# Patient Record
Sex: FEMALE | Race: WHITE | NOT HISPANIC OR LATINO | Employment: FULL TIME | URBAN - METROPOLITAN AREA
[De-identification: names, ages, dates, MRNs, and addresses within clinical notes are randomized per-mention and may not be internally consistent; named-entity substitution may affect disease eponyms.]

---

## 2019-08-18 ENCOUNTER — ANESTHESIA EVENT (OUTPATIENT)
Dept: PERIOP | Facility: HOSPITAL | Age: 59
End: 2019-08-18

## 2019-08-18 ENCOUNTER — ANESTHESIA (OUTPATIENT)
Dept: PERIOP | Facility: HOSPITAL | Age: 59
End: 2019-08-18

## 2019-08-18 ENCOUNTER — HOSPITAL ENCOUNTER (OUTPATIENT)
Facility: HOSPITAL | Age: 59
Discharge: HOME | End: 2019-08-19
Attending: EMERGENCY MEDICINE | Admitting: HOSPITALIST

## 2019-08-18 ENCOUNTER — APPOINTMENT (OUTPATIENT)
Dept: CT IMAGING | Facility: HOSPITAL | Age: 59
End: 2019-08-18

## 2019-08-18 DIAGNOSIS — N13.2 HYDRONEPHROSIS WITH URINARY OBSTRUCTION DUE TO RENAL CALCULUS: ICD-10-CM

## 2019-08-18 DIAGNOSIS — N20.1 URETEROLITHIASIS: Primary | ICD-10-CM

## 2019-08-18 DIAGNOSIS — R10.9 LEFT FLANK PAIN: ICD-10-CM

## 2019-08-18 LAB
ALBUMIN SERPL-MCNC: 4 G/DL (ref 3.5–4.8)
ALBUMIN/GLOB SERPL: 1.5 G/DL (ref 1–1.7)
ALP SERPL-CCNC: 58 U/L (ref 32–91)
ALT SERPL W P-5'-P-CCNC: 13 U/L (ref 14–54)
ANION GAP SERPL CALCULATED.3IONS-SCNC: 13.7 MMOL/L (ref 5–15)
ANION GAP SERPL CALCULATED.3IONS-SCNC: 17.4 MMOL/L (ref 5–15)
AST SERPL-CCNC: 18 U/L (ref 15–41)
BACTERIA UR QL AUTO: ABNORMAL /HPF
BASOPHILS # BLD AUTO: 0 10*3/MM3 (ref 0–0.2)
BASOPHILS # BLD AUTO: 0.1 10*3/MM3 (ref 0–0.2)
BASOPHILS NFR BLD AUTO: 0.3 % (ref 0–1.5)
BASOPHILS NFR BLD AUTO: 0.8 % (ref 0–1.5)
BILIRUB SERPL-MCNC: 0.6 MG/DL (ref 0.3–1.2)
BILIRUB UR QL STRIP: NEGATIVE
BUN BLD-MCNC: 15 MG/DL (ref 8–20)
BUN BLD-MCNC: 17 MG/DL (ref 8–20)
BUN/CREAT SERPL: 18.8 (ref 5.4–26.2)
BUN/CREAT SERPL: 21.3 (ref 5.4–26.2)
CALCIUM SPEC-SCNC: 8.3 MG/DL (ref 8.9–10.3)
CALCIUM SPEC-SCNC: 8.7 MG/DL (ref 8.9–10.3)
CHLORIDE SERPL-SCNC: 106 MMOL/L (ref 101–111)
CHLORIDE SERPL-SCNC: 107 MMOL/L (ref 101–111)
CLARITY UR: CLEAR
CO2 SERPL-SCNC: 17 MMOL/L (ref 22–32)
CO2 SERPL-SCNC: 23 MMOL/L (ref 22–32)
COLOR UR: YELLOW
CREAT BLD-MCNC: 0.8 MG/DL (ref 0.4–1)
CREAT BLD-MCNC: 0.8 MG/DL (ref 0.4–1)
DEPRECATED RDW RBC AUTO: 40.7 FL (ref 37–54)
DEPRECATED RDW RBC AUTO: 41.1 FL (ref 37–54)
EOSINOPHIL # BLD AUTO: 0 10*3/MM3 (ref 0–0.4)
EOSINOPHIL # BLD AUTO: 0.1 10*3/MM3 (ref 0–0.4)
EOSINOPHIL NFR BLD AUTO: 0.1 % (ref 0.3–6.2)
EOSINOPHIL NFR BLD AUTO: 1 % (ref 0.3–6.2)
ERYTHROCYTE [DISTWIDTH] IN BLOOD BY AUTOMATED COUNT: 13.1 % (ref 12.3–15.4)
ERYTHROCYTE [DISTWIDTH] IN BLOOD BY AUTOMATED COUNT: 13.1 % (ref 12.3–15.4)
GFR SERPL CREATININE-BSD FRML MDRD: 74 ML/MIN/1.73
GFR SERPL CREATININE-BSD FRML MDRD: 74 ML/MIN/1.73
GLOBULIN UR ELPH-MCNC: 2.6 GM/DL (ref 2.5–3.8)
GLUCOSE BLD-MCNC: 136 MG/DL (ref 65–99)
GLUCOSE BLD-MCNC: 137 MG/DL (ref 65–99)
GLUCOSE UR STRIP-MCNC: NEGATIVE MG/DL
HCT VFR BLD AUTO: 37.7 % (ref 34–46.6)
HCT VFR BLD AUTO: 39.2 % (ref 34–46.6)
HGB BLD-MCNC: 13.1 G/DL (ref 12–15.9)
HGB BLD-MCNC: 13.4 G/DL (ref 12–15.9)
HGB UR QL STRIP.AUTO: NEGATIVE
HYALINE CASTS UR QL AUTO: ABNORMAL /LPF
INR PPP: 0.99 (ref 0.9–1.1)
KETONES UR QL STRIP: ABNORMAL
LEUKOCYTE ESTERASE UR QL STRIP.AUTO: ABNORMAL
LIPASE SERPL-CCNC: 29 U/L (ref 22–51)
LYMPHOCYTES # BLD AUTO: 0.7 10*3/MM3 (ref 0.7–3.1)
LYMPHOCYTES # BLD AUTO: 2.1 10*3/MM3 (ref 0.7–3.1)
LYMPHOCYTES NFR BLD AUTO: 19.9 % (ref 19.6–45.3)
LYMPHOCYTES NFR BLD AUTO: 7.1 % (ref 19.6–45.3)
MCH RBC QN AUTO: 30.2 PG (ref 26.6–33)
MCH RBC QN AUTO: 30.7 PG (ref 26.6–33)
MCHC RBC AUTO-ENTMCNC: 34.2 G/DL (ref 31.5–35.7)
MCHC RBC AUTO-ENTMCNC: 34.7 G/DL (ref 31.5–35.7)
MCV RBC AUTO: 88.3 FL (ref 79–97)
MCV RBC AUTO: 88.7 FL (ref 79–97)
MONOCYTES # BLD AUTO: 0.3 10*3/MM3 (ref 0.1–0.9)
MONOCYTES # BLD AUTO: 0.6 10*3/MM3 (ref 0.1–0.9)
MONOCYTES NFR BLD AUTO: 3.4 % (ref 5–12)
MONOCYTES NFR BLD AUTO: 5.6 % (ref 5–12)
NEUTROPHILS # BLD AUTO: 7.7 10*3/MM3 (ref 1.7–7)
NEUTROPHILS # BLD AUTO: 8.9 10*3/MM3 (ref 1.7–7)
NEUTROPHILS NFR BLD AUTO: 72.7 % (ref 42.7–76)
NEUTROPHILS NFR BLD AUTO: 89.1 % (ref 42.7–76)
NITRITE UR QL STRIP: NEGATIVE
NRBC BLD AUTO-RTO: 0 /100 WBC (ref 0–0.2)
NRBC BLD AUTO-RTO: 0.1 /100 WBC (ref 0–0.2)
PH UR STRIP.AUTO: 7 [PH] (ref 5–8)
PLATELET # BLD AUTO: 285 10*3/MM3 (ref 140–450)
PLATELET # BLD AUTO: 308 10*3/MM3 (ref 140–450)
PMV BLD AUTO: 7.7 FL (ref 6–12)
PMV BLD AUTO: 7.8 FL (ref 6–12)
POTASSIUM BLD-SCNC: 3.4 MMOL/L (ref 3.6–5.1)
POTASSIUM BLD-SCNC: 4.7 MMOL/L (ref 3.6–5.1)
PROT SERPL-MCNC: 6.6 G/DL (ref 6.1–7.9)
PROT UR QL STRIP: NEGATIVE
PROTHROMBIN TIME: 10.2 SECONDS (ref 9.6–11.7)
RBC # BLD AUTO: 4.25 10*6/MM3 (ref 3.77–5.28)
RBC # BLD AUTO: 4.44 10*6/MM3 (ref 3.77–5.28)
RBC # UR: ABNORMAL /HPF
REF LAB TEST METHOD: ABNORMAL
SODIUM BLD-SCNC: 138 MMOL/L (ref 136–144)
SODIUM BLD-SCNC: 138 MMOL/L (ref 136–144)
SP GR UR STRIP: 1.02 (ref 1–1.03)
SQUAMOUS #/AREA URNS HPF: ABNORMAL /HPF
UROBILINOGEN UR QL STRIP: ABNORMAL
WBC NRBC COR # BLD: 10 10*3/MM3 (ref 3.4–10.8)
WBC NRBC COR # BLD: 10.6 10*3/MM3 (ref 3.4–10.8)
WBC UR QL AUTO: ABNORMAL /HPF

## 2019-08-18 PROCEDURE — 82360 CALCULUS ASSAY QUANT: CPT | Performed by: UROLOGY

## 2019-08-18 PROCEDURE — G0378 HOSPITAL OBSERVATION PER HR: HCPCS

## 2019-08-18 PROCEDURE — C1758 CATHETER, URETERAL: HCPCS | Performed by: UROLOGY

## 2019-08-18 PROCEDURE — C1769 GUIDE WIRE: HCPCS | Performed by: UROLOGY

## 2019-08-18 PROCEDURE — 87086 URINE CULTURE/COLONY COUNT: CPT | Performed by: EMERGENCY MEDICINE

## 2019-08-18 PROCEDURE — 85025 COMPLETE CBC W/AUTO DIFF WBC: CPT | Performed by: HOSPITALIST

## 2019-08-18 PROCEDURE — 83036 HEMOGLOBIN GLYCOSYLATED A1C: CPT | Performed by: HOSPITALIST

## 2019-08-18 PROCEDURE — 25010000002 HYDROMORPHONE PER 4 MG: Performed by: EMERGENCY MEDICINE

## 2019-08-18 PROCEDURE — 74176 CT ABD & PELVIS W/O CONTRAST: CPT

## 2019-08-18 PROCEDURE — 83690 ASSAY OF LIPASE: CPT | Performed by: EMERGENCY MEDICINE

## 2019-08-18 PROCEDURE — 96375 TX/PRO/DX INJ NEW DRUG ADDON: CPT

## 2019-08-18 PROCEDURE — 25010000002 ONDANSETRON PER 1 MG: Performed by: STUDENT IN AN ORGANIZED HEALTH CARE EDUCATION/TRAINING PROGRAM

## 2019-08-18 PROCEDURE — 81001 URINALYSIS AUTO W/SCOPE: CPT | Performed by: EMERGENCY MEDICINE

## 2019-08-18 PROCEDURE — 99219 PR INITIAL OBSERVATION CARE/DAY 50 MINUTES: CPT | Performed by: HOSPITALIST

## 2019-08-18 PROCEDURE — 80048 BASIC METABOLIC PNL TOTAL CA: CPT | Performed by: HOSPITALIST

## 2019-08-18 PROCEDURE — 85610 PROTHROMBIN TIME: CPT | Performed by: HOSPITALIST

## 2019-08-18 PROCEDURE — 25010000002 CEFTRIAXONE PER 250 MG: Performed by: HOSPITALIST

## 2019-08-18 PROCEDURE — 25010000002 PROPOFOL 10 MG/ML EMULSION: Performed by: STUDENT IN AN ORGANIZED HEALTH CARE EDUCATION/TRAINING PROGRAM

## 2019-08-18 PROCEDURE — 25010000002 KETOROLAC TROMETHAMINE PER 15 MG: Performed by: STUDENT IN AN ORGANIZED HEALTH CARE EDUCATION/TRAINING PROGRAM

## 2019-08-18 PROCEDURE — 25010000002 FENTANYL CITRATE (PF) 100 MCG/2ML SOLUTION: Performed by: STUDENT IN AN ORGANIZED HEALTH CARE EDUCATION/TRAINING PROGRAM

## 2019-08-18 PROCEDURE — 96376 TX/PRO/DX INJ SAME DRUG ADON: CPT

## 2019-08-18 PROCEDURE — 25010000002 CEFTRIAXONE PER 250 MG: Performed by: EMERGENCY MEDICINE

## 2019-08-18 PROCEDURE — C2617 STENT, NON-COR, TEM W/O DEL: HCPCS | Performed by: UROLOGY

## 2019-08-18 PROCEDURE — 85025 COMPLETE CBC W/AUTO DIFF WBC: CPT | Performed by: EMERGENCY MEDICINE

## 2019-08-18 PROCEDURE — 25010000002 DEXAMETHASONE PER 1 MG: Performed by: STUDENT IN AN ORGANIZED HEALTH CARE EDUCATION/TRAINING PROGRAM

## 2019-08-18 PROCEDURE — 99284 EMERGENCY DEPT VISIT MOD MDM: CPT

## 2019-08-18 PROCEDURE — 25010000002 KETOROLAC TROMETHAMINE PER 15 MG: Performed by: EMERGENCY MEDICINE

## 2019-08-18 PROCEDURE — 25010000002 ONDANSETRON PER 1 MG: Performed by: EMERGENCY MEDICINE

## 2019-08-18 PROCEDURE — 80053 COMPREHEN METABOLIC PANEL: CPT | Performed by: EMERGENCY MEDICINE

## 2019-08-18 PROCEDURE — 96374 THER/PROPH/DIAG INJ IV PUSH: CPT

## 2019-08-18 DEVICE — URETERAL STENT
Type: IMPLANTABLE DEVICE | Site: URETER | Status: FUNCTIONAL
Brand: PERCUFLEX™ PLUS

## 2019-08-18 RX ORDER — PANTOPRAZOLE SODIUM 40 MG/1
40 TABLET, DELAYED RELEASE ORAL
Status: DISCONTINUED | OUTPATIENT
Start: 2019-08-18 | End: 2019-08-19 | Stop reason: HOSPADM

## 2019-08-18 RX ORDER — ONDANSETRON 2 MG/ML
INJECTION INTRAMUSCULAR; INTRAVENOUS AS NEEDED
Status: DISCONTINUED | OUTPATIENT
Start: 2019-08-18 | End: 2019-08-18 | Stop reason: SURG

## 2019-08-18 RX ORDER — ONDANSETRON 2 MG/ML
4 INJECTION INTRAMUSCULAR; INTRAVENOUS ONCE
Status: COMPLETED | OUTPATIENT
Start: 2019-08-18 | End: 2019-08-18

## 2019-08-18 RX ORDER — SODIUM CHLORIDE 9 MG/ML
INJECTION, SOLUTION INTRAVENOUS CONTINUOUS PRN
Status: DISCONTINUED | OUTPATIENT
Start: 2019-08-18 | End: 2019-08-18 | Stop reason: SURG

## 2019-08-18 RX ORDER — HYDROMORPHONE HCL 110MG/55ML
1 PATIENT CONTROLLED ANALGESIA SYRINGE INTRAVENOUS ONCE
Status: COMPLETED | OUTPATIENT
Start: 2019-08-18 | End: 2019-08-18

## 2019-08-18 RX ORDER — SODIUM CHLORIDE 9 MG/ML
100 INJECTION, SOLUTION INTRAVENOUS CONTINUOUS
Status: DISCONTINUED | OUTPATIENT
Start: 2019-08-18 | End: 2019-08-19 | Stop reason: HOSPADM

## 2019-08-18 RX ORDER — SODIUM CHLORIDE 0.9 % (FLUSH) 0.9 %
3-10 SYRINGE (ML) INJECTION AS NEEDED
Status: DISCONTINUED | OUTPATIENT
Start: 2019-08-18 | End: 2019-08-19 | Stop reason: HOSPADM

## 2019-08-18 RX ORDER — ACETAMINOPHEN 160 MG/5ML
650 SOLUTION ORAL EVERY 4 HOURS PRN
Status: DISCONTINUED | OUTPATIENT
Start: 2019-08-18 | End: 2019-08-19 | Stop reason: HOSPADM

## 2019-08-18 RX ORDER — KETOROLAC TROMETHAMINE 30 MG/ML
30 INJECTION, SOLUTION INTRAMUSCULAR; INTRAVENOUS ONCE
Status: COMPLETED | OUTPATIENT
Start: 2019-08-18 | End: 2019-08-18

## 2019-08-18 RX ORDER — POTASSIUM CHLORIDE 20 MEQ/1
40 TABLET, EXTENDED RELEASE ORAL AS NEEDED
Status: DISCONTINUED | OUTPATIENT
Start: 2019-08-18 | End: 2019-08-19 | Stop reason: HOSPADM

## 2019-08-18 RX ORDER — KETOROLAC TROMETHAMINE 30 MG/ML
INJECTION, SOLUTION INTRAMUSCULAR; INTRAVENOUS AS NEEDED
Status: DISCONTINUED | OUTPATIENT
Start: 2019-08-18 | End: 2019-08-18 | Stop reason: SURG

## 2019-08-18 RX ORDER — ACETAMINOPHEN 650 MG/1
650 SUPPOSITORY RECTAL EVERY 4 HOURS PRN
Status: DISCONTINUED | OUTPATIENT
Start: 2019-08-18 | End: 2019-08-19 | Stop reason: HOSPADM

## 2019-08-18 RX ORDER — DOCUSATE SODIUM 100 MG/1
100 CAPSULE, LIQUID FILLED ORAL 2 TIMES DAILY PRN
Status: DISCONTINUED | OUTPATIENT
Start: 2019-08-18 | End: 2019-08-19 | Stop reason: HOSPADM

## 2019-08-18 RX ORDER — SODIUM CHLORIDE 0.9 % (FLUSH) 0.9 %
3 SYRINGE (ML) INJECTION EVERY 12 HOURS SCHEDULED
Status: DISCONTINUED | OUTPATIENT
Start: 2019-08-18 | End: 2019-08-19 | Stop reason: HOSPADM

## 2019-08-18 RX ORDER — DEXAMETHASONE SODIUM PHOSPHATE 4 MG/ML
INJECTION, SOLUTION INTRA-ARTICULAR; INTRALESIONAL; INTRAMUSCULAR; INTRAVENOUS; SOFT TISSUE AS NEEDED
Status: DISCONTINUED | OUTPATIENT
Start: 2019-08-18 | End: 2019-08-18 | Stop reason: SURG

## 2019-08-18 RX ORDER — ONDANSETRON 4 MG/1
4 TABLET, FILM COATED ORAL EVERY 6 HOURS PRN
Status: DISCONTINUED | OUTPATIENT
Start: 2019-08-18 | End: 2019-08-19 | Stop reason: HOSPADM

## 2019-08-18 RX ORDER — PROPOFOL 10 MG/ML
VIAL (ML) INTRAVENOUS AS NEEDED
Status: DISCONTINUED | OUTPATIENT
Start: 2019-08-18 | End: 2019-08-18 | Stop reason: SURG

## 2019-08-18 RX ORDER — FENTANYL CITRATE 50 UG/ML
INJECTION, SOLUTION INTRAMUSCULAR; INTRAVENOUS AS NEEDED
Status: DISCONTINUED | OUTPATIENT
Start: 2019-08-18 | End: 2019-08-18 | Stop reason: SURG

## 2019-08-18 RX ORDER — ONDANSETRON 2 MG/ML
4 INJECTION INTRAMUSCULAR; INTRAVENOUS EVERY 6 HOURS PRN
Status: DISCONTINUED | OUTPATIENT
Start: 2019-08-18 | End: 2019-08-19 | Stop reason: HOSPADM

## 2019-08-18 RX ORDER — LIDOCAINE HYDROCHLORIDE 20 MG/ML
INJECTION, SOLUTION EPIDURAL; INFILTRATION; INTRACAUDAL; PERINEURAL AS NEEDED
Status: DISCONTINUED | OUTPATIENT
Start: 2019-08-18 | End: 2019-08-18 | Stop reason: SURG

## 2019-08-18 RX ORDER — HYDROCODONE BITARTRATE AND ACETAMINOPHEN 5; 325 MG/1; MG/1
1 TABLET ORAL EVERY 4 HOURS PRN
Status: DISCONTINUED | OUTPATIENT
Start: 2019-08-18 | End: 2019-08-19 | Stop reason: HOSPADM

## 2019-08-18 RX ORDER — MORPHINE SULFATE 4 MG/ML
2 INJECTION, SOLUTION INTRAMUSCULAR; INTRAVENOUS
Status: DISCONTINUED | OUTPATIENT
Start: 2019-08-18 | End: 2019-08-19 | Stop reason: HOSPADM

## 2019-08-18 RX ORDER — CEPHALEXIN 500 MG/1
500 CAPSULE ORAL 3 TIMES DAILY
Qty: 20 CAPSULE | Refills: 0 | Status: SHIPPED | OUTPATIENT
Start: 2019-08-18 | End: 2019-08-28

## 2019-08-18 RX ORDER — POTASSIUM CHLORIDE 7.45 MG/ML
10 INJECTION INTRAVENOUS
Status: DISCONTINUED | OUTPATIENT
Start: 2019-08-18 | End: 2019-08-19 | Stop reason: HOSPADM

## 2019-08-18 RX ORDER — POTASSIUM CHLORIDE 1.5 G/1.77G
40 POWDER, FOR SOLUTION ORAL AS NEEDED
Status: DISCONTINUED | OUTPATIENT
Start: 2019-08-18 | End: 2019-08-19 | Stop reason: HOSPADM

## 2019-08-18 RX ORDER — CALCIUM CARBONATE 200(500)MG
1 TABLET,CHEWABLE ORAL 3 TIMES DAILY PRN
Status: DISCONTINUED | OUTPATIENT
Start: 2019-08-18 | End: 2019-08-19 | Stop reason: HOSPADM

## 2019-08-18 RX ORDER — ACETAMINOPHEN 325 MG/1
650 TABLET ORAL EVERY 4 HOURS PRN
Status: DISCONTINUED | OUTPATIENT
Start: 2019-08-18 | End: 2019-08-19 | Stop reason: HOSPADM

## 2019-08-18 RX ORDER — HYDROCODONE BITARTRATE AND ACETAMINOPHEN 7.5; 325 MG/1; MG/1
1 TABLET ORAL EVERY 4 HOURS PRN
Qty: 20 TABLET | Refills: 0 | Status: SHIPPED | OUTPATIENT
Start: 2019-08-18 | End: 2019-08-28

## 2019-08-18 RX ORDER — SODIUM CHLORIDE 0.9 % (FLUSH) 0.9 %
10 SYRINGE (ML) INJECTION AS NEEDED
Status: DISCONTINUED | OUTPATIENT
Start: 2019-08-18 | End: 2019-08-19 | Stop reason: HOSPADM

## 2019-08-18 RX ADMIN — LIDOCAINE HYDROCHLORIDE 100 MG: 20 INJECTION, SOLUTION EPIDURAL; INFILTRATION; INTRACAUDAL; PERINEURAL at 08:41

## 2019-08-18 RX ADMIN — KETOROLAC TROMETHAMINE 30 MG: 30 INJECTION, SOLUTION INTRAMUSCULAR at 06:01

## 2019-08-18 RX ADMIN — PROPOFOL 120 MG: 10 INJECTION, EMULSION INTRAVENOUS at 08:43

## 2019-08-18 RX ADMIN — CEFTRIAXONE SODIUM 1 G: 1 INJECTION, POWDER, FOR SOLUTION INTRAMUSCULAR; INTRAVENOUS at 10:23

## 2019-08-18 RX ADMIN — ONDANSETRON 4 MG: 2 INJECTION INTRAMUSCULAR; INTRAVENOUS at 06:42

## 2019-08-18 RX ADMIN — Medication 3 ML: at 10:23

## 2019-08-18 RX ADMIN — DEXAMETHASONE SODIUM PHOSPHATE 4 MG: 4 INJECTION, SOLUTION INTRAMUSCULAR; INTRAVENOUS at 08:50

## 2019-08-18 RX ADMIN — SODIUM CHLORIDE 1000 ML: 900 INJECTION, SOLUTION INTRAVENOUS at 05:25

## 2019-08-18 RX ADMIN — ONDANSETRON 4 MG: 2 INJECTION INTRAMUSCULAR; INTRAVENOUS at 08:52

## 2019-08-18 RX ADMIN — KETOROLAC TROMETHAMINE 30 MG: 30 INJECTION, SOLUTION INTRAMUSCULAR at 08:57

## 2019-08-18 RX ADMIN — FENTANYL CITRATE 100 MCG: 50 INJECTION, SOLUTION INTRAMUSCULAR; INTRAVENOUS at 08:41

## 2019-08-18 RX ADMIN — CEFTRIAXONE SODIUM 1 G: 10 INJECTION, POWDER, FOR SOLUTION INTRAVENOUS at 07:17

## 2019-08-18 RX ADMIN — PANTOPRAZOLE SODIUM 40 MG: 40 TABLET, DELAYED RELEASE ORAL at 10:23

## 2019-08-18 RX ADMIN — SODIUM CHLORIDE 100 ML/HR: 900 INJECTION, SOLUTION INTRAVENOUS at 10:24

## 2019-08-18 RX ADMIN — ONDANSETRON 4 MG: 2 INJECTION INTRAMUSCULAR; INTRAVENOUS at 05:26

## 2019-08-18 RX ADMIN — HYDROMORPHONE HYDROCHLORIDE 1 MG: 2 INJECTION, SOLUTION INTRAMUSCULAR; INTRAVENOUS; SUBCUTANEOUS at 05:26

## 2019-08-18 RX ADMIN — SODIUM CHLORIDE: 0.9 INJECTION, SOLUTION INTRAVENOUS at 08:52

## 2019-08-18 RX ADMIN — HYDROMORPHONE HYDROCHLORIDE 1 MG: 2 INJECTION, SOLUTION INTRAMUSCULAR; INTRAVENOUS; SUBCUTANEOUS at 06:42

## 2019-08-18 NOTE — ED PROVIDER NOTES
"Subjective   58-year-old female presents with left flank pain.  Patient states the pain started about 3 hours ago.  She states she had some diarrhea earlier in the evening.  She states the pain is severe without radiation.  She has had nausea and vomiting.  She denies any dysuria or hematuria.  She has had no fever.  She denies any alleviating or exacerbating factors.        History provided by:  Patient      Review of Systems   Constitutional: Negative for fatigue and fever.   HENT: Negative for congestion and sore throat.    Eyes: Negative for pain and redness.   Respiratory: Negative for cough and shortness of breath.    Cardiovascular: Negative for chest pain and palpitations.   Gastrointestinal: Positive for abdominal pain, diarrhea, nausea and vomiting.   Genitourinary: Positive for flank pain. Negative for dysuria, frequency and hematuria.   Musculoskeletal: Positive for back pain.   Skin: Negative for rash.   Neurological: Negative for dizziness and headaches.   Psychiatric/Behavioral: Negative for behavioral problems and confusion.       History reviewed. No pertinent past medical history.    No Known Allergies    History reviewed. No pertinent surgical history.    History reviewed. No pertinent family history.    Social History     Socioeconomic History   • Marital status:      Spouse name: Not on file   • Number of children: Not on file   • Years of education: Not on file   • Highest education level: Not on file   Tobacco Use   • Smoking status: Never Smoker   Substance and Sexual Activity   • Alcohol use: No     Frequency: Never       /64   Pulse 74   Temp 97.5 °F (36.4 °C)   Resp 24   Ht 165.1 cm (65\")   Wt 85.2 kg (187 lb 13.3 oz)   LMP 08/01/2019 (Approximate)   SpO2 93%   BMI 31.26 kg/m²       Objective   Physical Exam   Constitutional: She is oriented to person, place, and time. She appears well-developed and well-nourished.   HENT:   Head: Normocephalic and atraumatic.   Eyes: " EOM are normal. Pupils are equal, round, and reactive to light.   Neck: Normal range of motion. Neck supple.   Cardiovascular: Normal rate, regular rhythm and normal heart sounds.   Pulmonary/Chest: Effort normal and breath sounds normal. No respiratory distress.   Abdominal: Soft. Bowel sounds are normal. There is tenderness in the left upper quadrant and left lower quadrant. There is CVA tenderness.   Musculoskeletal: Normal range of motion.   Neurological: She is alert and oriented to person, place, and time.   Skin: Skin is warm and dry.   Nursing note and vitals reviewed.      Procedures           ED Course      Results for orders placed or performed during the hospital encounter of 08/18/19   Comprehensive Metabolic Panel   Result Value Ref Range    Glucose 136 (H) 65 - 99 mg/dL    BUN 17 8 - 20 mg/dL    Creatinine 0.80 0.40 - 1.00 mg/dL    Sodium 138 136 - 144 mmol/L    Potassium 3.4 (L) 3.6 - 5.1 mmol/L    Chloride 107 101 - 111 mmol/L    CO2 17.0 (L) 22.0 - 32.0 mmol/L    Calcium 8.7 (L) 8.9 - 10.3 mg/dL    Total Protein 6.6 6.1 - 7.9 g/dL    Albumin 4.00 3.50 - 4.80 g/dL    ALT (SGPT) 13 (L) 14 - 54 U/L    AST (SGOT) 18 15 - 41 U/L    Alkaline Phosphatase 58 32 - 91 U/L    Total Bilirubin 0.6 0.3 - 1.2 mg/dL    eGFR Non African Amer 74 >60 mL/min/1.73    Globulin 2.6 2.5 - 3.8 gm/dL    A/G Ratio 1.5 1.0 - 1.7 g/dL    BUN/Creatinine Ratio 21.3 5.4 - 26.2    Anion Gap 17.4 (H) 5.0 - 15.0 mmol/L   Lipase   Result Value Ref Range    Lipase 29 22 - 51 U/L   Urinalysis With Culture If Indicated - Urine, Clean Catch   Result Value Ref Range    Color, UA Yellow Yellow, Straw    Appearance, UA Clear Clear    pH, UA 7.0 5.0 - 8.0    Specific Gravity, UA 1.020 1.005 - 1.030    Glucose, UA Negative Negative    Ketones, UA 15 mg/dL (1+) (A) Negative    Bilirubin, UA Negative Negative    Blood, UA Negative Negative    Protein, UA Negative Negative    Leuk Esterase, UA Moderate (2+) (A) Negative    Nitrite, UA Negative  Negative    Urobilinogen, UA 0.2 E.U./dL 0.2 - 1.0 E.U./dL   CBC Auto Differential   Result Value Ref Range    WBC 10.60 3.40 - 10.80 10*3/mm3    RBC 4.44 3.77 - 5.28 10*6/mm3    Hemoglobin 13.4 12.0 - 15.9 g/dL    Hematocrit 39.2 34.0 - 46.6 %    MCV 88.3 79.0 - 97.0 fL    MCH 30.2 26.6 - 33.0 pg    MCHC 34.2 31.5 - 35.7 g/dL    RDW 13.1 12.3 - 15.4 %    RDW-SD 40.7 37.0 - 54.0 fl    MPV 7.7 6.0 - 12.0 fL    Platelets 308 140 - 450 10*3/mm3    Neutrophil % 72.7 42.7 - 76.0 %    Lymphocyte % 19.9 19.6 - 45.3 %    Monocyte % 5.6 5.0 - 12.0 %    Eosinophil % 1.0 0.3 - 6.2 %    Basophil % 0.8 0.0 - 1.5 %    Neutrophils, Absolute 7.70 (H) 1.70 - 7.00 10*3/mm3    Lymphocytes, Absolute 2.10 0.70 - 3.10 10*3/mm3    Monocytes, Absolute 0.60 0.10 - 0.90 10*3/mm3    Eosinophils, Absolute 0.10 0.00 - 0.40 10*3/mm3    Basophils, Absolute 0.10 0.00 - 0.20 10*3/mm3    nRBC 0.0 0.0 - 0.2 /100 WBC   Urinalysis, Microscopic Only - Urine, Clean Catch   Result Value Ref Range    RBC, UA 0-2 (A) None Seen /HPF    WBC, UA 6-12 (A) None Seen /HPF    Bacteria, UA None Seen None Seen /HPF    Squamous Epithelial Cells, UA 0-2 None Seen, 0-2 /HPF    Hyaline Casts, UA 0-2 None Seen /LPF    Methodology Manual Light Microscopy      Ct Abdomen Pelvis Without Contrast    Result Date: 8/18/2019  1.  4 mm stone in the proximal left ureter resulting in severe left-sided hydronephrosis. 2.  Left adrenal mass that is likely an adenoma measuring 2.7 cm across. 3.  Incidentally noted right hepatic lobe cyst. Electronically signed by:  Terry Carney M.D.  8/18/2019 4:13 AM                MDM  Patient had the above evaluation.  Results were discussed with the patient.  IV access was established and the patient was given Dilaudid and Zofran.  She continued to have severe pain.  She was given additional doses of Toradol and Dilaudid.  She states her pain is now getting better.  CT is showing a 4 mm stone in the proximal left ureter with severe  left-sided hydronephrosis.  She was given a dose of Rocephin.  I discussed with the urologist on-call who will consult on the patient.  I also discussed with the hospitalist who agreed to admit.    Final diagnoses:   Ureterolithiasis   Hydronephrosis with urinary obstruction due to renal calculus   Left flank pain            Miles Isaacs MD  08/18/19 [id number]

## 2019-08-18 NOTE — ANESTHESIA PROCEDURE NOTES
Airway  Urgency: elective    Date/Time: 8/18/2019 8:43 AM  End Time:8/18/2019 8:47 AM  Airway not difficult    General Information and Staff    Patient location during procedure: OR  Anesthesiologist: Terry Robertson MD    Indications and Patient Condition  Indications for airway management: CNS depression    Preoxygenated: yes  MILS maintained throughout  Mask difficulty assessment: 0 - not attempted    Final Airway Details  Final airway type: supraglottic airway      Successful airway: unique  Size 4    Number of attempts at approach: 1

## 2019-08-18 NOTE — OP NOTE
CYSTOSCOPY URETEROSCOPY LASER LITHOTRIPSY  Procedure Report    Patient Name:  Lili Cates  YOB: 1960    Date of Surgery:  8/18/2019     Indications: Left ureteral calculus causing significant left flank pain measuring approximately 4 mm in the proximal left ureter    Pre-op Diagnosis:   Ureterolithiasis [N20.1]  Hydronephrosis with urinary obstruction due to renal calculus [N13.2]  Left flank pain [R10.9]       Post-Op Diagnosis Codes:     * Ureterolithiasis [N20.1]     * Hydronephrosis with urinary obstruction due to renal calculus [N13.2]     * Left flank pain [R10.9]    Procedure/CPT® Codes:      Procedure(s):  CYSTOSCOPY L URETEROSCOPIC STONE EXTRACTION PLACEMENT L URETERAL STENT    Staff:  Surgeon(s):  Yvon Blake MD         Anesthesia: General    Estimated Blood Loss: minimal    Implants:    Left ureteral stent    Specimen:          A: Left ureteral calculus      Findings: Proximal left ureteral calculus was removed    Complications: None    Description of Procedure: Patient induced with general anesthesia and placed in dorsal lithotomy position.  Prepped and draped in sterile fashion.  22 Slovenian cystoscope introduced under direct vision.  Urethra is within normal limits.  Bladder was normal throughout with no tumor stone diverticulum or foreign body.  Both ureteral orifice visualized and are normal.    Guidewire was passed through the cystoscope and up the left ureter under fluoroscopy.  Dual-lumen catheter used to dilate the distal ureter.  Rigid ureteroscope passed next to the wire and up into the proximal ureter.  Could not get to the level of the stone.  Therefore a second wire was placed.  The flexible ureteroscope was then passed over this wire and up to the level of the stone.  The stone was grasped and removed using a nitinol basket.  Repeat ureteroscopy did not reveal any further stones.    Indwelling wire was backloaded through the cystoscope.  A 6 [place]ian by 26 cm double-J  stent was passed over the wire and up into the left kidney under fluoroscopy.  The wire was removed.  A good curl was seen in the left kidney under fluoroscopy.  A good curl was seen in the bladder endoscopically.  Patient's bladder was emptied cystoscope was removed.  Patient was taken out of the dorsolithotomy position.  Awakened from general anesthesia.  And transported to the postanesthesia care unit in stable condition having tolerated the procedure well without any complications.  She will follow-up in approximately 1 week for a cystoscopy with stent removal as part of a planned staged procedure      Yvon Blake MD     Date: 8/18/2019  Time: 9:11 AM

## 2019-08-18 NOTE — ANESTHESIA PREPROCEDURE EVALUATION
Anesthesia Evaluation     Patient summary reviewed and Nursing notes reviewed   no history of anesthetic complications:  NPO Solid Status: > 8 hours  NPO Liquid Status: > 2 hours           Airway   Mallampati: I  TM distance: >3 FB  Neck ROM: full  No difficulty expected  Dental    (+) poor dentition    Pulmonary - negative pulmonary ROS and normal exam   Cardiovascular - negative cardio ROS and normal exam        Neuro/Psych- negative ROS  GI/Hepatic/Renal/Endo - negative ROS     Musculoskeletal (-) negative ROS    Abdominal  - normal exam   Substance History - negative use     OB/GYN          Other - negative ROS       ROS/Med Hx Other: Never had surgery or anesthesia                  Anesthesia Plan    ASA 2     general     intravenous induction   Anesthetic plan, all risks, benefits, and alternatives have been provided, discussed and informed consent has been obtained with: patient and spouse/significant other.

## 2019-08-18 NOTE — H&P
[facility] HOSPITALIST     Provider, No Known    CHIEF COMPLAINT: Flank pain    HISTORY OF PRESENT ILLNESS:  Patient is a 58-year-old female with no known past medical history however she does not see a doctor on a regular basis.  She presents here today with left flank pain.  She says she is been having some aches in that area for the past week however this morning at about 1 AM she was woken up by severe left flank pain.  Pain was sharp constant and did not radiate.  She does not have dysuria however did notice some blood in her urine one time.  She did have some chills over the past week no mikael fevers.  No nausea no vomiting no chest pain no shortness of breath.  No rash.  No recent weight loss.  Of note she woke up this morning with ecchymosis around her right eye.  She does not know how that happened.  She says she does bruise easily and she thinks she might have rubbed her eye vigorously last evening.  She has no vision changes.  No headache.  No focal weakness no focal numbness no abdominal pain no PND no orthopnea.    ER course UA showed moderate 2+ leukocytes, no blood, CBC showed a WBC count of 10, hemoglobin 13.1, platelets 285, creatinine 0.8, sodium 138.  CT abdomen pelvis showed a 4 mm stone in the proximal left ureter resulting in severe left-sided hydronephrosis, left adrenal mass also was noted 2.7 cm across as well as a livercyst the right lobe.    Past medical history as above  Past surgical history negative  Social history negative x3  Family history diabetes runs in multiple family members  Allergies no known drug allergies  Review of systems negative apart for HPI      History reviewed. No pertinent past medical history.  History reviewed. No pertinent surgical history.  History reviewed. No pertinent family history.  Social History     Tobacco Use   • Smoking status: Never Smoker   Substance Use Topics   • Alcohol use: No     Frequency: Never   • Drug use: Not on file  "    No medications prior to admission.     Allergies:  Patient has no known allergies.      There is no immunization history on file for this patient.    Physical Exam   Constitutional: He is oriented to person, place, and time. No distress.   HENT:   Head: Normocephalic and atraumatic.   Eyes: Conjunctivae and EOM are normal. Pupils are equal, round, and reactive to light.   Neck: No JVD present. No thyromegaly present.   Cardiovascular: Normal rate, regular rhythm, normal heart sounds and intact distal pulses. Exam reveals no gallop and no friction rub.   No murmur heard.  Pulmonary/Chest: Effort normal and breath sounds normal. No stridor. No respiratory distress. He has no wheezes. He has no rales. He exhibits no tenderness.   Abdominal: Soft. Bowel sounds are normal. He exhibits no distension and no mass. There is no tenderness. There is no rebound and no guarding. No hernia.   Musculoskeletal: Normal range of motion.   Lymphadenopathy:     He has no cervical adenopathy.   Neurological: He is alert and oriented to person, place, and time. No cranial nerve deficit or sensory deficit. He exhibits normal muscle tone.   Skin: No rash noted. He is not diaphoretic.   Psychiatric: He has a normal mood and affect.   Vitals reviewed.      Vital Signs  Visit Vitals  /74 (BP Location: Right arm, Patient Position: Lying)   Pulse 55   Temp 97.8 °F (36.6 °C) (Oral)   Resp 15   Ht 165.1 cm (65\")   Wt 85.2 kg (187 lb 13.3 oz)   LMP 08/01/2019 (Approximate)   SpO2 98%   BMI 31.26 kg/m²       Physical Exam         Results Review:    I reviewed the patient's new clinical results.  Lab Results (last 24 hours)     Procedure Component Value Units Date/Time    Basic Metabolic Panel [[id number]]  (Abnormal) Collected:  08/18/19 1029    Specimen:  Blood Updated:  08/18/19 1102     Glucose 137 mg/dL      BUN 15 mg/dL      Creatinine 0.80 mg/dL      Sodium 138 mmol/L      Potassium 4.7 mmol/L      Chloride 106 mmol/L      CO2 23.0 " mmol/L      Calcium 8.3 mg/dL      eGFR Non African Amer 74 mL/min/1.73      BUN/Creatinine Ratio 18.8     Anion Gap 13.7 mmol/L     CBC & Differential [[id number]] Collected:  08/18/19 1029    Specimen:  Blood Updated:  08/18/19 1037    Narrative:       The following orders were created for panel order CBC & Differential.  Procedure                               Abnormality         Status                     ---------                               -----------         ------                     CBC Auto Differential[[id number]]        Abnormal            Final result                 Please view results for these tests on the individual orders.    CBC Auto Differential [[id number]]  (Abnormal) Collected:  08/18/19 1029    Specimen:  Blood Updated:  08/18/19 1037     WBC 10.00 10*3/mm3      RBC 4.25 10*6/mm3      Hemoglobin 13.1 g/dL      Hematocrit 37.7 %      MCV 88.7 fL      MCH 30.7 pg      MCHC 34.7 g/dL      RDW 13.1 %      RDW-SD 41.1 fl      MPV 7.8 fL      Platelets 285 10*3/mm3      Neutrophil % 89.1 %      Lymphocyte % 7.1 %      Monocyte % 3.4 %      Eosinophil % 0.1 %      Basophil % 0.3 %      Neutrophils, Absolute 8.90 10*3/mm3      Lymphocytes, Absolute 0.70 10*3/mm3      Monocytes, Absolute 0.30 10*3/mm3      Eosinophils, Absolute 0.00 10*3/mm3      Basophils, Absolute 0.00 10*3/mm3      nRBC 0.1 /100 WBC     Urinalysis, Microscopic Only - Urine, Clean Catch [[id number]]  (Abnormal) Collected:  08/18/19 0632    Specimen:  Urine, Clean Catch Updated:  08/18/19 0702     RBC, UA 0-2 /HPF      WBC, UA 6-12 /HPF      Bacteria, UA None Seen /HPF      Squamous Epithelial Cells, UA 0-2 /HPF      Hyaline Casts, UA 0-2 /LPF      Methodology Manual Light Microscopy    Urinalysis With Culture If Indicated - Urine, Clean Catch [[id number]]  (Abnormal) Collected:  08/18/19 0632    Specimen:  Urine, Clean Catch Updated:  08/18/19 0702     Color, UA Yellow     Appearance, UA Clear     pH, UA 7.0     Specific [place], UA  1.020     Glucose, UA Negative     Ketones, UA 15 mg/dL (1+)     Bilirubin, UA Negative     Blood, UA Negative     Protein, UA Negative     Leuk Esterase, UA Moderate (2+)     Nitrite, UA Negative     Urobilinogen, UA 0.2 E.U./dL    Urine Culture - Urine, Urine, Clean Catch [[id number]] Collected:  08/18/19 0632    Specimen:  Urine, Clean Catch Updated:  08/18/19 0702    Comprehensive Metabolic Panel [[id number]]  (Abnormal) Collected:  08/18/19 0531    Specimen:  Blood Updated:  08/18/19 0610     Glucose 136 mg/dL      BUN 17 mg/dL      Creatinine 0.80 mg/dL      Sodium 138 mmol/L      Potassium 3.4 mmol/L      Chloride 107 mmol/L      CO2 17.0 mmol/L      Calcium 8.7 mg/dL      Total Protein 6.6 g/dL      Albumin 4.00 g/dL      ALT (SGPT) 13 U/L      AST (SGOT) 18 U/L      Alkaline Phosphatase 58 U/L      Total Bilirubin 0.6 mg/dL      eGFR Non African Amer 74 mL/min/1.73      Globulin 2.6 gm/dL      A/G Ratio 1.5 g/dL      BUN/Creatinine Ratio 21.3     Anion Gap 17.4 mmol/L     Lipase [[id number]]  (Normal) Collected:  08/18/19 0531    Specimen:  Blood Updated:  08/18/19 0610     Lipase 29 U/L     CBC & Differential [[id number]] Collected:  08/18/19 0531    Specimen:  Blood Updated:  08/18/19 0537    Narrative:       The following orders were created for panel order CBC & Differential.  Procedure                               Abnormality         Status                     ---------                               -----------         ------                     CBC Auto Differential[[id number]]        Abnormal            Final result                 Please view results for these tests on the individual orders.    CBC Auto Differential [[id number]]  (Abnormal) Collected:  08/18/19 0531    Specimen:  Blood Updated:  08/18/19 0537     WBC 10.60 10*3/mm3      RBC 4.44 10*6/mm3      Hemoglobin 13.4 g/dL      Hematocrit 39.2 %      MCV 88.3 fL      MCH 30.2 pg      MCHC 34.2 g/dL      RDW 13.1 %      RDW-SD 40.7 fl      MPV 7.7  fL      Platelets 308 10*3/mm3      Neutrophil % 72.7 %      Lymphocyte % 19.9 %      Monocyte % 5.6 %      Eosinophil % 1.0 %      Basophil % 0.8 %      Neutrophils, Absolute 7.70 10*3/mm3      Lymphocytes, Absolute 2.10 10*3/mm3      Monocytes, Absolute 0.60 10*3/mm3      Eosinophils, Absolute 0.10 10*3/mm3      Basophils, Absolute 0.10 10*3/mm3      nRBC 0.0 /100 WBC               Assessment/Plan     Left flank pain  Due to nephrolithiasis and hydronephrosis  Urology on board plan for ureteroscopy  Pain management    Urinary tract infection  Start on ceftriaxone which will be continued  Follow-up on microbiology    Renal mass  Per urology    Liver cyst  LFTs normal  Ultrasound of liver can be done either in or outpatient  We will give outpatient follow-up with GI    Right orbital ecchymosis  No obvious trauma  Check PT/INR patient reports easy bruising  Platelets normal  On no medications at home  Will need follow-up with heme-onc as an outpatient  Hemoglobin, WBC count, platelets within normal limits  Renal function stable normal    Routine  Family history of diabetes we will go ahead and check hemoglobin A1c    DVT PUD prophylaxis    Plan as above      Clarisa Fine MD  08/18/19  12:22 PM

## 2019-08-18 NOTE — CONSULTS
Urology Consult Note    Patient:Lili Cates :1960  Room:  Admit Date2019  Age:58 y.o.     SEX:female     DOS:2019     MR:[id number]     Visit:[id number]       Attending: Miles Isaacs MD  Referring Provider: Dr Fine  Reason for Consultation: Left ureteral calculus    Patient Care Team:  Provider, No Known as PCP - General    Chief complaint left flank pain    Subjective .     History of present illness: 58-year-old woman with some vague left flank pain for approximately 1 week.  Pain became very severe early this a.m..  Pain is 10 out of 10.  Does radiate somewhat to the left lower quadrant.  It is colicky in nature.  There is associated nausea and vomiting.  Pain is better with IV pain medication.  Patient denies any previous history of kidney stones.    CT scan shows a 4 mm proximal left ureteral calculus with severe hydroureteronephrosis down to the level of the stone.  No other stones are seen.  I reviewed these images and interpreted them.    Review of Systems  10 point review of systems were reviewed and are negative except for:  Constitution:  positive for See HPI    History  History reviewed. No pertinent past medical history.  History reviewed. No pertinent surgical history.  Social History     Socioeconomic History   • Marital status:      Spouse name: Not on file   • Number of children: Not on file   • Years of education: Not on file   • Highest education level: Not on file   Tobacco Use   • Smoking status: Never Smoker   Substance and Sexual Activity   • Alcohol use: No     Frequency: Never     History reviewed. No pertinent family history.  Allergy  No Known Allergies  Prior to Admission medications    Not on File         Objective     tMax 24 hours:  Temp (24hrs), Av.5 °F (36.4 °C), Min:97.5 °F (36.4 °C), Max:97.5 °F (36.4 °C)    Vital Sign Ranges:  Temp:  [97.5 °F (36.4 °C)] 97.5 °F (36.4 °C)  Heart Rate:  [74] 74  Resp:  [24] 24  BP: (141-178)/(64-92)  141/64  Intake and Output Last 3 Shifts:  No intake/output data recorded.      Physical Exam:   General Appearance: alert, appears stated age and cooperative  Head: normocephalic, without obvious abnormality and atraumatic  Eyes: lids and lashes normal, conjunctivae and sclerae normal, no icterus, no pallor and corneas clear  Lungs: respirations regular, respirations even and respirations unlabored  Heart: regular rhythm & normal rate  Abdomen: no guarding, no rebound tenderness and Left CVA tenderness  Extremities: moves extremities well, no edema, no cyanosis and no redness  Skin: no bleeding, bruising or rash  Neurologic: Mental Status orientated to person, place, time and situation    Results Review:     Lab Results (last 24 hours)     Procedure Component Value Units Date/Time    Urinalysis, Microscopic Only - Urine, Clean Catch [[id number]]  (Abnormal) Collected:  08/18/19 0632    Specimen:  Urine, Clean Catch Updated:  08/18/19 0702     RBC, UA 0-2 /HPF      WBC, UA 6-12 /HPF      Bacteria, UA None Seen /HPF      Squamous Epithelial Cells, UA 0-2 /HPF      Hyaline Casts, UA 0-2 /LPF      Methodology Manual Light Microscopy    Urinalysis With Culture If Indicated - Urine, Clean Catch [[id number]]  (Abnormal) Collected:  08/18/19 0632    Specimen:  Urine, Clean Catch Updated:  08/18/19 0702     Color, UA Yellow     Appearance, UA Clear     pH, UA 7.0     Specific Gravity, UA 1.020     Glucose, UA Negative     Ketones, UA 15 mg/dL (1+)     Bilirubin, UA Negative     Blood, UA Negative     Protein, UA Negative     Leuk Esterase, UA Moderate (2+)     Nitrite, UA Negative     Urobilinogen, UA 0.2 E.U./dL    Urine Culture - Urine, Urine, Clean Catch [[id number]] Collected:  08/18/19 0632    Specimen:  Urine, Clean Catch Updated:  08/18/19 0702    Comprehensive Metabolic Panel [[id number]]  (Abnormal) Collected:  08/18/19 0531    Specimen:  Blood Updated:  08/18/19 0610     Glucose 136 mg/dL      BUN 17 mg/dL       Creatinine 0.80 mg/dL      Sodium 138 mmol/L      Potassium 3.4 mmol/L      Chloride 107 mmol/L      CO2 17.0 mmol/L      Calcium 8.7 mg/dL      Total Protein 6.6 g/dL      Albumin 4.00 g/dL      ALT (SGPT) 13 U/L      AST (SGOT) 18 U/L      Alkaline Phosphatase 58 U/L      Total Bilirubin 0.6 mg/dL      eGFR Non African Amer 74 mL/min/1.73      Globulin 2.6 gm/dL      A/G Ratio 1.5 g/dL      BUN/Creatinine Ratio 21.3     Anion Gap 17.4 mmol/L     Lipase [[id number]]  (Normal) Collected:  08/18/19 0531    Specimen:  Blood Updated:  08/18/19 0610     Lipase 29 U/L     CBC & Differential [[id number]] Collected:  08/18/19 0531    Specimen:  Blood Updated:  08/18/19 0537    Narrative:       The following orders were created for panel order CBC & Differential.  Procedure                               Abnormality         Status                     ---------                               -----------         ------                     CBC Auto Differential[[id number]]        Abnormal            Final result                 Please view results for these tests on the individual orders.    CBC Auto Differential [[id number]]  (Abnormal) Collected:  08/18/19 0531    Specimen:  Blood Updated:  08/18/19 0537     WBC 10.60 10*3/mm3      RBC 4.44 10*6/mm3      Hemoglobin 13.4 g/dL      Hematocrit 39.2 %      MCV 88.3 fL      MCH 30.2 pg      MCHC 34.2 g/dL      RDW 13.1 %      RDW-SD 40.7 fl      MPV 7.7 fL      Platelets 308 10*3/mm3      Neutrophil % 72.7 %      Lymphocyte % 19.9 %      Monocyte % 5.6 %      Eosinophil % 1.0 %      Basophil % 0.8 %      Neutrophils, Absolute 7.70 10*3/mm3      Lymphocytes, Absolute 2.10 10*3/mm3      Monocytes, Absolute 0.60 10*3/mm3      Eosinophils, Absolute 0.10 10*3/mm3      Basophils, Absolute 0.10 10*3/mm3      nRBC 0.0 /100 WBC          No results found for: URINECX     Imaging Results (last 7 days)     Procedure Component Value Units Date/Time    CT Abdomen Pelvis Without Contrast [[id number]]  Collected:  08/18/19 0406     Updated:  08/18/19 0614    Narrative:       EXAMINATION:  CT SCAN OF THE ABDOMEN AND PELVIS WITHOUT INTRAVENOUS CONTRAST    DATE OF EXAM: 8/18/2019 5:39 AM     HISTORY: Left flank pain      COMPARISON: None.    TECHNIQUE: CT examination of the abdomen and pelvis with sagittal and coronal reformations was performed without intravenous contrast.  CT dose lowering techniques were used, to include: automated exposure control, adjustment for patient size, and/or use   of iterative reconstruction.    Note: The exam is limited because some types of pathology may not be adequately demonstrated due to lack of contrast enhancement.       FINDINGS:    ABDOMEN/PELVIS:    Lower Chest:  Normal.     Liver: There is a cyst within the posterior aspect of the right hepatic lobe that measures 3.5 cm across.    Gallbladder/Biliary: Normal.     Pancreas: Normal.    Spleen: Normal.     Adrenal Glands: Left adrenal mass that is likely an adenoma. It measures 2.7 cm across.    Kidneys: There is a stone in the proximal left ureter that measures 4 mm and is resulting in severe left-sided hydronephrosis.    GI Tract: Normal.     Mesentery/Peritoneum: Left-sided perinephric inflammatory stranding.    Vasculature: Normal.     Lymph Nodes: Normal.     Abdominal Wall: Normal.     Bladder: Normal.     Reproductive: Normal.     Musculoskeletal: Normal.        Impression:         1.  4 mm stone in the proximal left ureter resulting in severe left-sided hydronephrosis.  2.  Left adrenal mass that is likely an adenoma measuring 2.7 cm across.  3.  Incidentally noted right hepatic lobe cyst.      Electronically signed by:  Terry Carney M.D.    8/18/2019 4:13 AM          Inpatient Meds:   Scheduled Meds:    Continuous Infusions:    PRN Meds:.•  [COMPLETED] Insert peripheral IV **AND** sodium chloride      Assessment/Plan     Active Problems:    Ureterolithiasis    Left flank pain  Obstructing proximal left ureteral  calculus    Plan  Proceed with cystoscopy left ureteroscopic stone extraction with possible laser lithotripsy and placement of a left ureteral stent.  Risks, benefits, and alternatives discussed with patient.  She wishes to proceed.        I discussed the patients findings and my recommendations with patient and family    Thank you for this  consult    Yvon Blake MD  08/18/19  7:39 AM

## 2019-08-18 NOTE — ANESTHESIA POSTPROCEDURE EVALUATION
Patient: Lili Cates    Procedure Summary     Date:  08/18/19 Room / Location:  [facility] OR 08 / [facility] MAIN OR    Anesthesia Start:  0840 Anesthesia Stop:  0915    Procedure:  CYSTOSCOPY, URETEROSCOPY, stone extraction, stent placement. (Left ) Diagnosis:       Ureterolithiasis      Hydronephrosis with urinary obstruction due to renal calculus      Left flank pain      (Ureterolithiasis [N20.1])      (Hydronephrosis with urinary obstruction due to renal calculus [N13.2])      (Left flank pain [R10.9])    Surgeon:  Yvon Blake MD Provider:  Terry Robertson MD    Anesthesia Type:  general ASA Status:  2          Anesthesia Type: general  Last vitals  BP   128/74 (08/18/19 1014)   Temp   97.8 °F (36.6 °C) (08/18/19 1014)   Pulse   55 (08/18/19 1014)   Resp   15 (08/18/19 1014)     SpO2   98 % (08/18/19 1014)     Post Anesthesia Care and Evaluation    Patient location during evaluation: PACU  Patient participation: complete - patient participated  Level of consciousness: awake  Pain score: 0  Pain management: adequate  Airway patency: patent  Anesthetic complications: No anesthetic complications  PONV Status: none  Cardiovascular status: acceptable  Respiratory status: acceptable  Hydration status: acceptable    Comments: Patient seen and examined postoperatively; vital signs stable; SpO2 greater than or equal to 90%; cardiopulmonary status stable; nausea/vomiting adequately controlled; pain adequately controlled; no apparent anesthesia complications; patient discharged from anesthesia care when discharge criteria were met

## 2019-08-19 VITALS
SYSTOLIC BLOOD PRESSURE: 124 MMHG | TEMPERATURE: 98.2 F | HEART RATE: 60 BPM | DIASTOLIC BLOOD PRESSURE: 71 MMHG | RESPIRATION RATE: 15 BRPM | HEIGHT: 65 IN | WEIGHT: 187.83 LBS | OXYGEN SATURATION: 98 % | BODY MASS INDEX: 31.29 KG/M2

## 2019-08-19 LAB
ANION GAP SERPL CALCULATED.3IONS-SCNC: 14.7 MMOL/L (ref 5–15)
BACTERIA SPEC AEROBE CULT: NO GROWTH
BASOPHILS # BLD AUTO: 0 10*3/MM3 (ref 0–0.2)
BASOPHILS NFR BLD AUTO: 0.2 % (ref 0–1.5)
BUN BLD-MCNC: 7 MG/DL (ref 8–20)
BUN/CREAT SERPL: 10 (ref 5.4–26.2)
CALCIUM SPEC-SCNC: 8.4 MG/DL (ref 8.9–10.3)
CHLORIDE SERPL-SCNC: 107 MMOL/L (ref 101–111)
CO2 SERPL-SCNC: 21 MMOL/L (ref 22–32)
CREAT BLD-MCNC: 0.7 MG/DL (ref 0.4–1)
DEPRECATED RDW RBC AUTO: 41.6 FL (ref 37–54)
EOSINOPHIL # BLD AUTO: 0 10*3/MM3 (ref 0–0.4)
EOSINOPHIL NFR BLD AUTO: 0.3 % (ref 0.3–6.2)
ERYTHROCYTE [DISTWIDTH] IN BLOOD BY AUTOMATED COUNT: 13.2 % (ref 12.3–15.4)
GFR SERPL CREATININE-BSD FRML MDRD: 87 ML/MIN/1.73
GLUCOSE BLD-MCNC: 97 MG/DL (ref 65–99)
HBA1C MFR BLD: 5 % (ref 3.5–5.6)
HCT VFR BLD AUTO: 38 % (ref 34–46.6)
HGB BLD-MCNC: 12.7 G/DL (ref 12–15.9)
LYMPHOCYTES # BLD AUTO: 1.8 10*3/MM3 (ref 0.7–3.1)
LYMPHOCYTES NFR BLD AUTO: 17.6 % (ref 19.6–45.3)
MCH RBC QN AUTO: 29.7 PG (ref 26.6–33)
MCHC RBC AUTO-ENTMCNC: 33.4 G/DL (ref 31.5–35.7)
MCV RBC AUTO: 89.1 FL (ref 79–97)
MONOCYTES # BLD AUTO: 0.9 10*3/MM3 (ref 0.1–0.9)
MONOCYTES NFR BLD AUTO: 8.7 % (ref 5–12)
NEUTROPHILS # BLD AUTO: 7.6 10*3/MM3 (ref 1.7–7)
NEUTROPHILS NFR BLD AUTO: 73.2 % (ref 42.7–76)
NRBC BLD AUTO-RTO: 0 /100 WBC (ref 0–0.2)
PLATELET # BLD AUTO: 302 10*3/MM3 (ref 140–450)
PMV BLD AUTO: 8.4 FL (ref 6–12)
POTASSIUM BLD-SCNC: 3.7 MMOL/L (ref 3.6–5.1)
RBC # BLD AUTO: 4.26 10*6/MM3 (ref 3.77–5.28)
SODIUM BLD-SCNC: 139 MMOL/L (ref 136–144)
WBC NRBC COR # BLD: 10.3 10*3/MM3 (ref 3.4–10.8)

## 2019-08-19 PROCEDURE — 99217 PR OBSERVATION CARE DISCHARGE MANAGEMENT: CPT | Performed by: HOSPITALIST

## 2019-08-19 PROCEDURE — 85025 COMPLETE CBC W/AUTO DIFF WBC: CPT | Performed by: HOSPITALIST

## 2019-08-19 PROCEDURE — 80048 BASIC METABOLIC PNL TOTAL CA: CPT | Performed by: HOSPITALIST

## 2019-08-19 PROCEDURE — G0378 HOSPITAL OBSERVATION PER HR: HCPCS

## 2019-08-19 RX ADMIN — HYDROCODONE BITARTRATE AND ACETAMINOPHEN 1 TABLET: 5; 325 TABLET ORAL at 08:23

## 2019-08-19 RX ADMIN — PANTOPRAZOLE SODIUM 40 MG: 40 TABLET, DELAYED RELEASE ORAL at 06:25

## 2019-08-19 NOTE — DISCHARGE SUMMARY
Date of Admission: 8/18/2019    Date of Discharge:  8/19/2019    Length of stay:  LOS: 0 days   Hospital Course  Patient is a 58-year-old female with no known past medical history however she does not see a doctor on a regular basis.  She presents here today with left flank pain.  She says she is been having some aches in that area for the past week however this morning at about 1 AM she was woken up by severe left flank pain.  Pain was sharp constant and did not radiate.  She does not have dysuria however did notice some blood in her urine one time.  She did have some chills over the past week no mikael fevers.  No nausea no vomiting no chest pain no shortness of breath.  No rash.  No recent weight loss.  Of note she woke up this morning with ecchymosis around her right eye.  She does not know how that happened.  She says she does bruise easily and she thinks she might have rubbed her eye vigorously last evening.  She has no vision changes.  No headache.  No focal weakness no focal numbness no abdominal pain no PND no orthopnea.     ER course UA showed moderate 2+ leukocytes, no blood, CBC showed a WBC count of 10, hemoglobin 13.1, platelets 285, creatinine 0.8, sodium 138.  CT abdomen pelvis showed a 4 mm stone in the proximal left ureter resulting in severe left-sided hydronephrosis, left adrenal mass also was noted 2.7 cm across as well as a livercyst the right lobe.     Past medical history as above  Past surgical history negative  Social history negative x3  Family history diabetes runs in multiple family members  Allergies no known drug allergies  Review of systems negative apart for HPI    Hospital course and problem list    Left flank pain  Due to nephrolithiasis and hydronephrosis  Urology on board patient underwent ureteroscopy with stone extraction and stent placement  Pain management     Urinary tract infection  Was on ceftriaxone here and will discharge on a course of Keflex per urology  recommendations  So far microbiology negative impending     Liver cyst  LFTs normal  Discussed  with the patient to follow-up as an outpatient with primary care physician for an ultrasound of right upper quadrant and also follow-up with GI as an outpatient    Adrenal mass  (Please note previously noted renal mass was a an error close (  Seen on CT abdomen  Left side on imaging appears to be adenoma recommended follow-up with heme-onc as an outpatient   electrolytes are stable, vital signs are stable       Right orbital ecchymosis  No obvious trauma  Check PT/INR patient reports easy bruising  Platelets normal  On no medications at home  Will need follow-up with heme-onc as an outpatient  Hemoglobin, WBC count, platelets within normal limits  Renal function stable normal     Routine  Family history of diabetes, hemoglobin A1c within normal limits at 5.8 Allred follow-up with primary care physician     DVT PUD prophylaxis     Plan as above cleared for discharge with close follow-up with consultants as mentioned above with urology and primary care physician and then with GI and heme-onc which is to be arranged with her primary care doctor as an outpatient.  I discussed this with the patient she verbalized understanding and agreed to make the appropriate follow-up appointments.    Physical Exam   Constitutional: He is oriented to person, place, and time. No distress.   HENT:   Head: Normocephalic and atraumatic.   Eyes: Conjunctivae and EOM are normal. Pupils are equal, round, and reactive to light.   Neck: No JVD present. No thyromegaly present.   Cardiovascular: Normal rate, regular rhythm, normal heart sounds and intact distal pulses. Exam reveals no gallop and no friction rub.   No murmur heard.  Pulmonary/Chest: Effort normal and breath sounds normal. No stridor. No respiratory distress. He has no wheezes. He has no rales. He exhibits no tenderness.   Abdominal: Soft. Bowel sounds are normal. He exhibits no  distension and no mass. There is no tenderness. There is no rebound and no guarding. No hernia.   Musculoskeletal: Normal range of motion.   Lymphadenopathy:     He has no cervical adenopathy.   Neurological: He is alert and oriented to person, place, and time. No cranial nerve deficit or sensory deficit. He exhibits normal muscle tone.   Skin: No rash noted. He is not diaphoretic.   Psychiatric: He has a normal mood and affect.   Vitals reviewed.                   Pertinent Test Results:     Lab Results (last 48 hours)     Procedure Component Value Units Date/Time    Hemoglobin A1c [[id number]]  (Normal) Collected:  08/18/19 1231    Specimen:  Blood Updated:  08/19/19 1046     Hemoglobin A1C 5.0 %     Narrative:       Hemoglobin A1C Reference Range:    <5.7 %        Normal  5.7-6.4 %     Increased risk for diabetes  > 6.4 %        Diabetes       These guidelines have been recommended by the American Diabetic Association for Hgb A1c.      The following 2010 guidelines have been recommended by the American Diabetes Association for Hemoglobin A1c.    HBA1c 5.7-6.4% Increased risk for future diabetes (pre-diabetes)  HBA1c     >6.4% Diabetes    Urine Culture - Urine, Urine, Clean Catch [[id number]]  (Normal) Collected:  08/18/19 0632    Specimen:  Urine, Clean Catch Updated:  08/19/19 0910     Urine Culture No growth    Basic Metabolic Panel [[id number]]  (Abnormal) Collected:  08/19/19 0231    Specimen:  Blood Updated:  08/19/19 0447     Glucose 97 mg/dL      BUN 7 mg/dL      Creatinine 0.70 mg/dL      Sodium 139 mmol/L      Potassium 3.7 mmol/L      Chloride 107 mmol/L      CO2 21.0 mmol/L      Calcium 8.4 mg/dL      eGFR Non African Amer 87 mL/min/1.73      BUN/Creatinine Ratio 10.0     Anion Gap 14.7 mmol/L     CBC & Differential [[id number]] Collected:  08/19/19 0231    Specimen:  Blood Updated:  08/19/19 0422    Narrative:       The following orders were created for panel order CBC & Differential.  Procedure                                Abnormality         Status                     ---------                               -----------         ------                     CBC Auto Differential[[id number]]        Abnormal            Final result                 Please view results for these tests on the individual orders.    CBC Auto Differential [[id number]]  (Abnormal) Collected:  08/19/19 0231    Specimen:  Blood Updated:  08/19/19 0422     WBC 10.30 10*3/mm3      RBC 4.26 10*6/mm3      Hemoglobin 12.7 g/dL      Hematocrit 38.0 %      MCV 89.1 fL      MCH 29.7 pg      MCHC 33.4 g/dL      RDW 13.2 %      RDW-SD 41.6 fl      MPV 8.4 fL      Platelets 302 10*3/mm3      Neutrophil % 73.2 %      Lymphocyte % 17.6 %      Monocyte % 8.7 %      Eosinophil % 0.3 %      Basophil % 0.2 %      Neutrophils, Absolute 7.60 10*3/mm3      Lymphocytes, Absolute 1.80 10*3/mm3      Monocytes, Absolute 0.90 10*3/mm3      Eosinophils, Absolute 0.00 10*3/mm3      Basophils, Absolute 0.00 10*3/mm3      nRBC 0.0 /100 WBC     Stone Analysis - Calculus, Ureter, Left [[id number]] Collected:  08/18/19 0918    Specimen:  Calculus from Ureter, Left Updated:  08/18/19 1504    Protime-INR [[id number]]  (Normal) Collected:  08/18/19 1231    Specimen:  Blood Updated:  08/18/19 1246     Protime 10.2 Seconds      INR 0.99    Basic Metabolic Panel [[id number]]  (Abnormal) Collected:  08/18/19 1029    Specimen:  Blood Updated:  08/18/19 1102     Glucose 137 mg/dL      BUN 15 mg/dL      Creatinine 0.80 mg/dL      Sodium 138 mmol/L      Potassium 4.7 mmol/L      Chloride 106 mmol/L      CO2 23.0 mmol/L      Calcium 8.3 mg/dL      eGFR Non African Amer 74 mL/min/1.73      BUN/Creatinine Ratio 18.8     Anion Gap 13.7 mmol/L     CBC & Differential [[id number]] Collected:  08/18/19 1029    Specimen:  Blood Updated:  08/18/19 1037    Narrative:       The following orders were created for panel order CBC & Differential.  Procedure                               Abnormality          Status                     ---------                               -----------         ------                     CBC Auto Differential[[id number]]        Abnormal            Final result                 Please view results for these tests on the individual orders.    CBC Auto Differential [[id number]]  (Abnormal) Collected:  08/18/19 1029    Specimen:  Blood Updated:  08/18/19 1037     WBC 10.00 10*3/mm3      RBC 4.25 10*6/mm3      Hemoglobin 13.1 g/dL      Hematocrit 37.7 %      MCV 88.7 fL      MCH 30.7 pg      MCHC 34.7 g/dL      RDW 13.1 %      RDW-SD 41.1 fl      MPV 7.8 fL      Platelets 285 10*3/mm3      Neutrophil % 89.1 %      Lymphocyte % 7.1 %      Monocyte % 3.4 %      Eosinophil % 0.1 %      Basophil % 0.3 %      Neutrophils, Absolute 8.90 10*3/mm3      Lymphocytes, Absolute 0.70 10*3/mm3      Monocytes, Absolute 0.30 10*3/mm3      Eosinophils, Absolute 0.00 10*3/mm3      Basophils, Absolute 0.00 10*3/mm3      nRBC 0.1 /100 WBC     Urinalysis, Microscopic Only - Urine, Clean Catch [[id number]]  (Abnormal) Collected:  08/18/19 0632    Specimen:  Urine, Clean Catch Updated:  08/18/19 0702     RBC, UA 0-2 /HPF      WBC, UA 6-12 /HPF      Bacteria, UA None Seen /HPF      Squamous Epithelial Cells, UA 0-2 /HPF      Hyaline Casts, UA 0-2 /LPF      Methodology Manual Light Microscopy    Urinalysis With Culture If Indicated - Urine, Clean Catch [[id number]]  (Abnormal) Collected:  08/18/19 0632    Specimen:  Urine, Clean Catch Updated:  08/18/19 0702     Color, UA Yellow     Appearance, UA Clear     pH, UA 7.0     Specific Gravity, UA 1.020     Glucose, UA Negative     Ketones, UA 15 mg/dL (1+)     Bilirubin, UA Negative     Blood, UA Negative     Protein, UA Negative     Leuk Esterase, UA Moderate (2+)     Nitrite, UA Negative     Urobilinogen, UA 0.2 E.U./dL    Comprehensive Metabolic Panel [[id number]]  (Abnormal) Collected:  08/18/19 0531    Specimen:  Blood Updated:  08/18/19 0610     Glucose 136 mg/dL       BUN 17 mg/dL      Creatinine 0.80 mg/dL      Sodium 138 mmol/L      Potassium 3.4 mmol/L      Chloride 107 mmol/L      CO2 17.0 mmol/L      Calcium 8.7 mg/dL      Total Protein 6.6 g/dL      Albumin 4.00 g/dL      ALT (SGPT) 13 U/L      AST (SGOT) 18 U/L      Alkaline Phosphatase 58 U/L      Total Bilirubin 0.6 mg/dL      eGFR Non African Amer 74 mL/min/1.73      Globulin 2.6 gm/dL      A/G Ratio 1.5 g/dL      BUN/Creatinine Ratio 21.3     Anion Gap 17.4 mmol/L     Lipase [[id number]]  (Normal) Collected:  08/18/19 0531    Specimen:  Blood Updated:  08/18/19 0610     Lipase 29 U/L     CBC & Differential [[id number]] Collected:  08/18/19 0531    Specimen:  Blood Updated:  08/18/19 0537    Narrative:       The following orders were created for panel order CBC & Differential.  Procedure                               Abnormality         Status                     ---------                               -----------         ------                     CBC Auto Differential[[id number]]        Abnormal            Final result                 Please view results for these tests on the individual orders.    CBC Auto Differential [[id number]]  (Abnormal) Collected:  08/18/19 0531    Specimen:  Blood Updated:  08/18/19 0537     WBC 10.60 10*3/mm3      RBC 4.44 10*6/mm3      Hemoglobin 13.4 g/dL      Hematocrit 39.2 %      MCV 88.3 fL      MCH 30.2 pg      MCHC 34.2 g/dL      RDW 13.1 %      RDW-SD 40.7 fl      MPV 7.7 fL      Platelets 308 10*3/mm3      Neutrophil % 72.7 %      Lymphocyte % 19.9 %      Monocyte % 5.6 %      Eosinophil % 1.0 %      Basophil % 0.8 %      Neutrophils, Absolute 7.70 10*3/mm3      Lymphocytes, Absolute 2.10 10*3/mm3      Monocytes, Absolute 0.60 10*3/mm3      Eosinophils, Absolute 0.10 10*3/mm3      Basophils, Absolute 0.10 10*3/mm3      nRBC 0.0 /100 WBC                  Imaging Results (all)     Procedure Component Value Units Date/Time    CT Abdomen Pelvis Without Contrast [[id number]] Collected:   "08/18/19 0406     Updated:  08/18/19 0614    Narrative:       EXAMINATION:  CT SCAN OF THE ABDOMEN AND PELVIS WITHOUT INTRAVENOUS CONTRAST    DATE OF EXAM: 8/18/2019 5:39 AM     HISTORY: Left flank pain      COMPARISON: None.    TECHNIQUE: CT examination of the abdomen and pelvis with sagittal and coronal reformations was performed without intravenous contrast.  CT dose lowering techniques were used, to include: automated exposure control, adjustment for patient size, and/or use   of iterative reconstruction.    Note: The exam is limited because some types of pathology may not be adequately demonstrated due to lack of contrast enhancement.       FINDINGS:    ABDOMEN/PELVIS:    Lower Chest:  Normal.     Liver: There is a cyst within the posterior aspect of the right hepatic lobe that measures 3.5 cm across.    Gallbladder/Biliary: Normal.     Pancreas: Normal.    Spleen: Normal.     Adrenal Glands: Left adrenal mass that is likely an adenoma. It measures 2.7 cm across.    Kidneys: There is a stone in the proximal left ureter that measures 4 mm and is resulting in severe left-sided hydronephrosis.    GI Tract: Normal.     Mesentery/Peritoneum: Left-sided perinephric inflammatory stranding.    Vasculature: Normal.     Lymph Nodes: Normal.     Abdominal Wall: Normal.     Bladder: Normal.     Reproductive: Normal.     Musculoskeletal: Normal.        Impression:         1.  4 mm stone in the proximal left ureter resulting in severe left-sided hydronephrosis.  2.  Left adrenal mass that is likely an adenoma measuring 2.7 cm across.  3.  Incidentally noted right hepatic lobe cyst.      Electronically signed by:  Terry Carney M.D.    8/18/2019 4:13 AM            Vital Signs  Visit Vitals  /71   Pulse 60   Temp 98.2 °F (36.8 °C)   Resp 15   Ht 165.1 cm (65\")   Wt 85.2 kg (187 lb 13.3 oz)   LMP 08/01/2019 (Approximate)   SpO2 98%   BMI 31.26 kg/m²       Physical Exam:  Physical Exam      Discharge Medications   "   Discharge Medications      New Medications      Instructions Start Date   cephalexin 500 MG capsule  Commonly known as:  KEFLEX   500 mg, Oral, 3 Times Daily      HYDROcodone-acetaminophen 7.5-325 MG per tablet  Commonly known as:  NORCO   1 tablet, Oral, Every 4 Hours PRN             Discharge Diet:   Diet Instructions     Diet: Regular      Discharge Diet:  Regular          Activity at Discharge:   Activity Instructions     Activity as Tolerated            Follow-up Appointments  Future Appointments   Date Time Provider Department Center   8/28/2019 11:15 AM Paradise Kirk MD [facility] None     Additional Instructions for the Follow-ups that You Need to Schedule     Discharge Follow-up with PCP   As directed       Currently Documented PCP:    Provider, No Known    PCP Phone Number:    None     Follow Up Details:  2-5 days         Discharge Follow-up with Specialty: urology one week   As directed      Specialty:  urology one week                   Clarisa Fine MD  08/19/19  3:13 PM    Time: Discharge 38 minutes min

## 2019-08-19 NOTE — PROGRESS NOTES
Urology Progress Note    Patient Identification:  Name:  Lili Cates  Age:  58 y.o.  Sex:  female  :  1960  MRN:  [id number]    Chief Complaint: Left flank pain    History of Present Illness: Patient feeling much better since removal of her left ureteral calculus and placement of a left ureteral stent.  She does have some mild stent discomfort but this is acceptable.    Problem List:  Patient Active Problem List   Diagnosis   • Left flank pain     Past Medical History:  History reviewed. No pertinent past medical history.  Past Surgical History:  History reviewed. No pertinent surgical history.  Home Meds:  No medications prior to admission.     Current Meds:    Current Facility-Administered Medications:   •  acetaminophen (TYLENOL) tablet 650 mg, 650 mg, Oral, Q4H PRN **OR** acetaminophen (TYLENOL) 160 MG/5ML solution 650 mg, 650 mg, Oral, Q4H PRN **OR** acetaminophen (TYLENOL) suppository 650 mg, 650 mg, Rectal, Q4H PRN, Yvon Blake MD  •  calcium carbonate (TUMS) chewable tablet 500 mg (200 mg elemental), 1 tablet, Oral, TID PRN, Clarisa Fine MD  •  cefTRIAXone (ROCEPHIN) 1 g in sodium chloride 0.9 % 100 mL IVPB, 1 g, Intravenous, Q24H, Clarisa Fine MD, Last Rate: 200 mL/hr at 19 1023, 1 g at 19 1023  •  docusate sodium (COLACE) capsule 100 mg, 100 mg, Oral, BID PRN, Clarisa Fine MD  •  HYDROcodone-acetaminophen (NORCO) 5-325 MG per tablet 1 tablet, 1 tablet, Oral, Q4H PRN, Clarisa Fine MD  •  Morphine sulfate (PF) injection 2 mg, 2 mg, Intravenous, Q2H PRN, Clarisa Fine MD  •  ondansetron (ZOFRAN) tablet 4 mg, 4 mg, Oral, Q6H PRN **OR** ondansetron (ZOFRAN) injection 4 mg, 4 mg, Intravenous, Q6H PRN, Clarisa Fine MD  •  pantoprazole (PROTONIX) EC tablet 40 mg, 40 mg, Oral, Q AM, Clarisa Fine MD, 40 mg at 19 0625  •  potassium chloride (K-DUR,KLOR-CON) CR tablet 40 mEq, 40 mEq, Oral, PRN **OR** potassium chloride (KLOR-CON) packet 40 mEq, 40 mEq, Oral, PRN  "**OR** potassium chloride 10 mEq in 100 mL IVPB, 10 mEq, Intravenous, Q1H PRN, Clarisa Fine MD  •  [COMPLETED] Insert peripheral IV, , , Once **AND** sodium chloride 0.9 % flush 10 mL, 10 mL, Intravenous, PRN, Miles Isaacs MD  •  sodium chloride 0.9 % flush 3 mL, 3 mL, Intravenous, Q12H, Clarisa Fine MD, 3 mL at 19 1023  •  sodium chloride 0.9 % flush 3-10 mL, 3-10 mL, Intravenous, PRN, Clarisa Fine MD  •  sodium chloride 0.9 % infusion, 100 mL/hr, Intravenous, Continuous, Clarisa Fine MD, Last Rate: 100 mL/hr at 19 1024, 100 mL/hr at 19 1024  Allergies:  Patient has no known allergies.    Review of Systems no fevers or chills.  No congestion or nasal discharge.    Objective:  tMax 24 hours:  Temp (24hrs), Av °F (36.7 °C), Min:97.4 °F (36.3 °C), Max:98.6 °F (37 °C)    Vital Sign Ranges:  Temp:  [97.4 °F (36.3 °C)-98.6 °F (37 °C)] 98.3 °F (36.8 °C)  Heart Rate:  [54-87] 62  Resp:  [8-16] 16  BP: (117-155)/(56-83) 155/80  Intake and Output Last 3 Shifts:  I/O last 3 completed shifts:  In: 2665 [P.O.:600; I.V.:2065]  Out: 3825 [Urine:3825]    Exam:  /80 (BP Location: Left arm, Patient Position: Sitting)   Pulse 62   Temp 98.3 °F (36.8 °C) (Oral)   Resp 16   Ht 165.1 cm (65\")   Wt 85.2 kg (187 lb 13.3 oz)   LMP 2019 (Approximate)   SpO2 96%   BMI 31.26 kg/m²    General Appearance:    Alert, cooperative, no acute distress, general       appearance is normal   Head:    Normocephalic, without obvious abnormality, atraumatic   Eyes:            Pupils/Irises normal. Exterior inspection conjunctiva       and lids normal.   Ears:    Normal external inspection   Nose:   Exterior inspection of nose is normal   Throat:   Lips, mucosa, and tongue normal   Lungs:     Respirations unlabored; normal effort, no audible     abnormality   CV:   Regular rhythm and normal rate, no edema   Abdomen:     examination of the abdomen is normal with     no masses, tenderness, or distension "    :       Data Review:  All labs (24hrs):    Lab Results (last 24 hours)     Procedure Component Value Units Date/Time    Basic Metabolic Panel [[id number]]  (Abnormal) Collected:  08/19/19 0231    Specimen:  Blood Updated:  08/19/19 0447     Glucose 97 mg/dL      BUN 7 mg/dL      Creatinine 0.70 mg/dL      Sodium 139 mmol/L      Potassium 3.7 mmol/L      Chloride 107 mmol/L      CO2 21.0 mmol/L      Calcium 8.4 mg/dL      eGFR Non African Amer 87 mL/min/1.73      BUN/Creatinine Ratio 10.0     Anion Gap 14.7 mmol/L     CBC & Differential [[id number]] Collected:  08/19/19 0231    Specimen:  Blood Updated:  08/19/19 0422    Narrative:       The following orders were created for panel order CBC & Differential.  Procedure                               Abnormality         Status                     ---------                               -----------         ------                     CBC Auto Differential[[id number]]        Abnormal            Final result                 Please view results for these tests on the individual orders.    CBC Auto Differential [[id number]]  (Abnormal) Collected:  08/19/19 0231    Specimen:  Blood Updated:  08/19/19 0422     WBC 10.30 10*3/mm3      RBC 4.26 10*6/mm3      Hemoglobin 12.7 g/dL      Hematocrit 38.0 %      MCV 89.1 fL      MCH 29.7 pg      MCHC 33.4 g/dL      RDW 13.2 %      RDW-SD 41.6 fl      MPV 8.4 fL      Platelets 302 10*3/mm3      Neutrophil % 73.2 %      Lymphocyte % 17.6 %      Monocyte % 8.7 %      Eosinophil % 0.3 %      Basophil % 0.2 %      Neutrophils, Absolute 7.60 10*3/mm3      Lymphocytes, Absolute 1.80 10*3/mm3      Monocytes, Absolute 0.90 10*3/mm3      Eosinophils, Absolute 0.00 10*3/mm3      Basophils, Absolute 0.00 10*3/mm3      nRBC 0.0 /100 WBC     Stone Analysis - Calculus, Ureter, Left [[id number]] Collected:  08/18/19 0918    Specimen:  Calculus from Ureter, Left Updated:  08/18/19 1504    Protime-INR [[id number]]  (Normal) Collected:  08/18/19 1231     Specimen:  Blood Updated:  08/18/19 1246     Protime 10.2 Seconds      INR 0.99    Hemoglobin A1c [[id number]] Collected:  08/18/19 1231    Specimen:  Blood Updated:  08/18/19 1237    Basic Metabolic Panel [[id number]]  (Abnormal) Collected:  08/18/19 1029    Specimen:  Blood Updated:  08/18/19 1102     Glucose 137 mg/dL      BUN 15 mg/dL      Creatinine 0.80 mg/dL      Sodium 138 mmol/L      Potassium 4.7 mmol/L      Chloride 106 mmol/L      CO2 23.0 mmol/L      Calcium 8.3 mg/dL      eGFR Non African Amer 74 mL/min/1.73      BUN/Creatinine Ratio 18.8     Anion Gap 13.7 mmol/L     CBC & Differential [[id number]] Collected:  08/18/19 1029    Specimen:  Blood Updated:  08/18/19 1037    Narrative:       The following orders were created for panel order CBC & Differential.  Procedure                               Abnormality         Status                     ---------                               -----------         ------                     CBC Auto Differential[[id number]]        Abnormal            Final result                 Please view results for these tests on the individual orders.    CBC Auto Differential [[id number]]  (Abnormal) Collected:  08/18/19 1029    Specimen:  Blood Updated:  08/18/19 1037     WBC 10.00 10*3/mm3      RBC 4.25 10*6/mm3      Hemoglobin 13.1 g/dL      Hematocrit 37.7 %      MCV 88.7 fL      MCH 30.7 pg      MCHC 34.7 g/dL      RDW 13.1 %      RDW-SD 41.1 fl      MPV 7.8 fL      Platelets 285 10*3/mm3      Neutrophil % 89.1 %      Lymphocyte % 7.1 %      Monocyte % 3.4 %      Eosinophil % 0.1 %      Basophil % 0.3 %      Neutrophils, Absolute 8.90 10*3/mm3      Lymphocytes, Absolute 0.70 10*3/mm3      Monocytes, Absolute 0.30 10*3/mm3      Eosinophils, Absolute 0.00 10*3/mm3      Basophils, Absolute 0.00 10*3/mm3      nRBC 0.1 /100 WBC         Radiology:   Imaging Results (last 72 hours)     Procedure Component Value Units Date/Time    CT Abdomen Pelvis Without Contrast [[id number]]  Collected:  08/18/19 0406     Updated:  08/18/19 0614    Narrative:       EXAMINATION:  CT SCAN OF THE ABDOMEN AND PELVIS WITHOUT INTRAVENOUS CONTRAST    DATE OF EXAM: 8/18/2019 5:39 AM     HISTORY: Left flank pain      COMPARISON: None.    TECHNIQUE: CT examination of the abdomen and pelvis with sagittal and coronal reformations was performed without intravenous contrast.  CT dose lowering techniques were used, to include: automated exposure control, adjustment for patient size, and/or use   of iterative reconstruction.    Note: The exam is limited because some types of pathology may not be adequately demonstrated due to lack of contrast enhancement.       FINDINGS:    ABDOMEN/PELVIS:    Lower Chest:  Normal.     Liver: There is a cyst within the posterior aspect of the right hepatic lobe that measures 3.5 cm across.    Gallbladder/Biliary: Normal.     Pancreas: Normal.    Spleen: Normal.     Adrenal Glands: Left adrenal mass that is likely an adenoma. It measures 2.7 cm across.    Kidneys: There is a stone in the proximal left ureter that measures 4 mm and is resulting in severe left-sided hydronephrosis.    GI Tract: Normal.     Mesentery/Peritoneum: Left-sided perinephric inflammatory stranding.    Vasculature: Normal.     Lymph Nodes: Normal.     Abdominal Wall: Normal.     Bladder: Normal.     Reproductive: Normal.     Musculoskeletal: Normal.        Impression:         1.  4 mm stone in the proximal left ureter resulting in severe left-sided hydronephrosis.  2.  Left adrenal mass that is likely an adenoma measuring 2.7 cm across.  3.  Incidentally noted right hepatic lobe cyst.      Electronically signed by:  Terry Carney M.D.    8/18/2019 4:13 AM          Assessment/Plan:    Active Problems:    Left flank pain    Left ureteral calculus which was removed stent has been placed  Left adrenal adenoma which will require monitoring    Plan  Okay for discharge from urology standpoint  My office will call to  arrange for removal of her stent in about 1 week  We will need repeat CT scan in 6 months to follow her adrenal adenoma        Yvon Blake MD  8/19/2019  7:18 AM

## 2019-08-19 NOTE — DISCHARGE INSTR - APPOINTMENTS
Dr. Blake's office to call for follow up appt. In 1 week. Call if you have not heard from office by Wed.

## 2019-08-19 NOTE — PROGRESS NOTES
Discharge Planning Assessment   [facility]     Patient Name: Lili Cates  MRN: [id number]  Today's Date: 8/19/2019    Admit Date: 8/18/2019    Discharge Needs Assessment     Row Name 08/19/19 1255       Living Environment    Lives With  spouse    Name(s) of Who Lives With Patient  Fer    Current Living Arrangements  home/apartment/condo    Primary Care Provided by  self    Provides Primary Care For  no one    Family Caregiver if Needed  spouse    Quality of Family Relationships  supportive    Able to Return to Prior Arrangements  yes       Resource/Environmental Concerns    Resource/Environmental Concerns  none    Transportation Concerns  car, none       Transition Planning    Patient/Family Anticipates Transition to  home with family    Patient/Family Anticipated Services at Transition  none    Transportation Anticipated  car, drives self       Discharge Needs Assessment    Readmission Within the Last 30 Days  no previous admission in last 30 days    Concerns to be Addressed  no discharge needs identified    Equipment Currently Used at Home  none    Anticipated Changes Related to Illness  none    Equipment Needed After Discharge  none    Offered/Gave Vendor List  other (see comments) none needed    Discharge Coordination/Progress  Routine D/C Home with spouse        Discharge Plan     Row Name 08/19/19 1257       Plan    Plan  Routine D/C Home with spouse    Patient/Family in Agreement with Plan  yes             Expected Discharge Date and Time     Expected Discharge Date Expected Discharge Time    Aug 19, 2019         Demographic Summary     Row Name 08/19/19 1240       General Information    Admission Type  observation    Arrived From  emergency department    Referral Source  other (see comments)    Reason for Consult  other (see comments)    Preferred Language  English     Used During This Interaction  no       Contact Information    Permission Granted to Share Info With      Contact  Information Obtained for             Name,   A. Naegele RN    Phone,   [phone]        Functional Status     Row Name 08/19/19 1251       Functional Status    Usual Activity Tolerance  good    Current Activity Tolerance  good       Functional Status, IADL    Medications  independent    Meal Preparation  independent    Housekeeping  independent    Laundry  independent    Shopping  independent       Mental Status    General Appearance WDL  WDL       Mental Status Summary    Recent Changes in Mental Status/Cognitive Functioning  no changes       Employment/    Employment Status  disabled        Psychosocial     Row Name 08/19/19 1252       Behavior WDL    Behavior WDL  WDL       Intellectual Performance WDL    Level of Consciousness  Alert       Coping/Stress    Major Change/Loss/Stressor  none    Patient Personal Strengths  able to adapt    Sources of Support  none    Reaction to Health Status  accepting    Understanding of Condition and Treatment  adequate understanding of medical condition;adequate understanding of treatment       C-SSRS (Recent)    Wish to be Dead  no    Suicidal Thoughts  no    Suicide Behavior  no       Violence Risk    Feels Like Hurting Others  no    Previous Attempt to Harm Others  no      .        Anna L Naegele, RN

## 2019-08-20 ENCOUNTER — READMISSION MANAGEMENT (OUTPATIENT)
Dept: CALL CENTER | Facility: HOSPITAL | Age: 59
End: 2019-08-20

## 2019-08-20 NOTE — PROGRESS NOTES
Case Management Discharge Note    Final Note: Routine D/C Home with spouse              Final Discharge Disposition Code: 01 - home or self-care

## 2019-08-20 NOTE — OUTREACH NOTE
Prep Survey      Responses   Facility patient discharged from?  [facility]   Is patient eligible?  Yes   Discharge diagnosis  Cystoscopy ureteroscopy stone extraction stent placement this visit   Does the patient have one of the following disease processes/diagnoses(primary or secondary)?  General Surgery   Does the patient have Home health ordered?  No   Is there a DME ordered?  No   Prep survey completed?  Yes          Shahida Emery RN

## 2019-08-21 ENCOUNTER — READMISSION MANAGEMENT (OUTPATIENT)
Dept: CALL CENTER | Facility: HOSPITAL | Age: 59
End: 2019-08-21

## 2019-08-21 NOTE — OUTREACH NOTE
General Surgery Week 1 Survey      Responses   Facility patient discharged from?  [facility]   Does the patient have one of the following disease processes/diagnoses(primary or secondary)?  General Surgery   Is there a successful TCM telephone encounter documented?  No   Week 1 attempt successful?  No   Revoke  Decline to participate [Unsuccessful attempt. ]          Jo Ann Dunlap RN

## 2019-08-23 LAB
CA PHOS CRY STONE QL IR: 10 %
COLOR STONE: NORMAL
COM CRY STONE QL IR: 90 %
COMPN STONE: NORMAL
CONV COMMENT: NORMAL
Lab: NORMAL
Lab: NORMAL
NIDUS STONE QL: NORMAL
PATH REPORT.COMMENTS IMP SPEC: NORMAL
SIZE STONE: NORMAL MM
WT STONE: 30.6 MG

## 2019-08-28 ENCOUNTER — OFFICE VISIT (OUTPATIENT)
Dept: FAMILY MEDICINE CLINIC | Facility: CLINIC | Age: 59
End: 2019-08-28

## 2019-08-28 ENCOUNTER — LAB (OUTPATIENT)
Dept: FAMILY MEDICINE CLINIC | Facility: CLINIC | Age: 59
End: 2019-08-28

## 2019-08-28 VITALS
HEIGHT: 66 IN | BODY MASS INDEX: 29.57 KG/M2 | HEART RATE: 71 BPM | OXYGEN SATURATION: 99 % | SYSTOLIC BLOOD PRESSURE: 156 MMHG | DIASTOLIC BLOOD PRESSURE: 92 MMHG | WEIGHT: 184 LBS

## 2019-08-28 DIAGNOSIS — Z12.31 SCREENING MAMMOGRAM, ENCOUNTER FOR: ICD-10-CM

## 2019-08-28 DIAGNOSIS — Z13.6 SCREENING FOR HEART DISEASE: ICD-10-CM

## 2019-08-28 DIAGNOSIS — Z00.00 WELL ADULT EXAM: ICD-10-CM

## 2019-08-28 DIAGNOSIS — D35.00 ADRENAL ADENOMA, UNSPECIFIED LATERALITY: ICD-10-CM

## 2019-08-28 DIAGNOSIS — N20.0 KIDNEY STONES: Primary | ICD-10-CM

## 2019-08-28 LAB
ARTICHOKE IGE QN: 123 MG/DL (ref 0–100)
CHOLEST SERPL-MCNC: 209 MG/DL
HDLC SERPL QL: 4.27
HDLC SERPL-MCNC: 49 MG/DL
LDLC/HDLC SERPL: 2.4 {RATIO}
TRIGL SERPL-MCNC: 211 MG/DL
VLDLC SERPL-MCNC: 42.2 MG/DL

## 2019-08-28 PROCEDURE — 80061 LIPID PANEL: CPT | Performed by: FAMILY MEDICINE

## 2019-08-28 PROCEDURE — 99204 OFFICE O/P NEW MOD 45 MIN: CPT | Performed by: FAMILY MEDICINE

## 2019-08-28 PROCEDURE — 36415 COLL VENOUS BLD VENIPUNCTURE: CPT

## 2019-08-28 NOTE — PROGRESS NOTES
"Subjective   Lili Cates is a 58 y.o. female.     Chief Complaint   Patient presents with   • Flank Pain     hospital f/u       HPI   Hospital f/u-flank pain-kidney stone, feeling better--cystoscopy and stent placement  She is a NO DOC f/u  Needs to establish care here.    Has not seen dr for many years    1.  4 mm stone in the proximal left ureter resulting in severe left-sided hydronephrosis.  2.  Left adrenal mass that is likely an adenoma measuring 2.7 cm across.  3.  Incidentally noted right hepatic lobe cyst.      bp home normal    Due colo, mammo, pap    tdap 2012      Hospital chart reviewed    Review of Systems      Past Medical History:   Diagnosis Date   • Kidney stones      Past Surgical History:   Procedure Laterality Date   • CYSTOSCOPY URETEROSCOPY LASER LITHOTRIPSY Left 8/18/2019    Procedure: CYSTOSCOPY, URETEROSCOPY, stone extraction, stent placement.;  Surgeon: Yvon Blake MD;  Location: [facility] OR;  Service: Urology     History reviewed. No pertinent family history.  Social History     Tobacco Use   • Smoking status: Never Smoker   • Smokeless tobacco: Never Used   Substance Use Topics   • Alcohol use: No     Frequency: Never       No Known Allergies      No current outpatient medications on file.          Visit Vitals  /92 (BP Location: Right arm, Cuff Size: Adult)   Pulse 71   Ht 166.4 cm (65.5\")   Wt 83.5 kg (184 lb)   LMP 08/01/2019 (Approximate)   SpO2 99%   BMI 30.15 kg/m²       Objective       Physical Exam   Constitutional: She is oriented to person, place, and time. She appears well-developed and well-nourished.   HENT:   Head: Normocephalic and atraumatic.   Neck: Normal range of motion. Neck supple.   Cardiovascular: Normal rate, regular rhythm and normal heart sounds.   Pulmonary/Chest: Effort normal and breath sounds normal.   Musculoskeletal: She exhibits no edema.   Neurological: She is alert and oriented to person, place, and time.   Psychiatric: She has a normal " mood and affect. Her behavior is normal. Judgment and thought content normal.   Vitals reviewed.        Diagnoses and all orders for this visit:    1. Kidney stones (Primary)  Comments:  resolved    2. Screening for heart disease  -     Lipid Panel; Future  -     Mammo Screening Digital Tomosynthesis Bilateral With CAD; Future  -     Ambulatory Referral For Screening Colonoscopy    3. Screening mammogram, encounter for  -     Lipid Panel; Future  -     Mammo Screening Digital Tomosynthesis Bilateral With CAD; Future  -     Ambulatory Referral For Screening Colonoscopy    4. Well adult exam  Comments:  f/u 1 month for physical and pap  Orders:  -     Lipid Panel; Future  -     Mammo Screening Digital Tomosynthesis Bilateral With CAD; Future  -     Ambulatory Referral For Screening Colonoscopy    5. Adrenal adenoma, unspecified laterality  Comments:  pt will f/u with urology

## 2019-08-29 ENCOUNTER — TELEPHONE (OUTPATIENT)
Dept: FAMILY MEDICINE CLINIC | Facility: CLINIC | Age: 59
End: 2019-08-29

## 2019-08-29 PROBLEM — D35.00 ADRENAL ADENOMA: Status: ACTIVE | Noted: 2019-08-29

## 2019-08-29 PROBLEM — N20.0 KIDNEY STONES: Status: ACTIVE | Noted: 2019-08-29

## 2019-08-29 NOTE — TELEPHONE ENCOUNTER
I reviewed the work-up for adrenal adenoma, advised patient to follow-up with urology for evaluation

## 2019-09-05 ENCOUNTER — HOSPITAL ENCOUNTER (OUTPATIENT)
Dept: MAMMOGRAPHY | Facility: HOSPITAL | Age: 59
Discharge: HOME | End: 2019-09-05
Admitting: FAMILY MEDICINE

## 2019-09-05 DIAGNOSIS — Z00.00 WELL ADULT EXAM: ICD-10-CM

## 2019-09-05 DIAGNOSIS — Z13.6 SCREENING FOR HEART DISEASE: ICD-10-CM

## 2019-09-05 DIAGNOSIS — Z12.31 SCREENING MAMMOGRAM, ENCOUNTER FOR: ICD-10-CM

## 2019-09-05 PROCEDURE — 77063 BREAST TOMOSYNTHESIS BI: CPT

## 2019-09-05 PROCEDURE — 77067 SCR MAMMO BI INCL CAD: CPT

## 2019-10-01 ENCOUNTER — OFFICE VISIT (OUTPATIENT)
Dept: FAMILY MEDICINE CLINIC | Facility: CLINIC | Age: 59
End: 2019-10-01

## 2019-10-01 VITALS
DIASTOLIC BLOOD PRESSURE: 86 MMHG | TEMPERATURE: 97.8 F | HEIGHT: 65 IN | WEIGHT: 184.8 LBS | OXYGEN SATURATION: 100 % | SYSTOLIC BLOOD PRESSURE: 133 MMHG | HEART RATE: 71 BPM | BODY MASS INDEX: 30.79 KG/M2

## 2019-10-01 DIAGNOSIS — K13.0 LIP MASS: ICD-10-CM

## 2019-10-01 DIAGNOSIS — Z00.00 ANNUAL PHYSICAL EXAM: Primary | ICD-10-CM

## 2019-10-01 DIAGNOSIS — H61.22 LEFT EAR IMPACTED CERUMEN: ICD-10-CM

## 2019-10-01 DIAGNOSIS — Z23 NEED FOR INFLUENZA VACCINATION: ICD-10-CM

## 2019-10-01 DIAGNOSIS — E66.09 CLASS 1 OBESITY DUE TO EXCESS CALORIES WITHOUT SERIOUS COMORBIDITY WITH BODY MASS INDEX (BMI) OF 30.0 TO 30.9 IN ADULT: ICD-10-CM

## 2019-10-01 DIAGNOSIS — Z11.59 NEED FOR HEPATITIS C SCREENING TEST: ICD-10-CM

## 2019-10-01 DIAGNOSIS — L57.8 SUN-DAMAGED SKIN: ICD-10-CM

## 2019-10-01 DIAGNOSIS — Z12.4 ENCOUNTER FOR SCREENING FOR CERVICAL CANCER: ICD-10-CM

## 2019-10-01 LAB — HCV AB SER DONR QL: NORMAL

## 2019-10-01 PROCEDURE — 86803 HEPATITIS C AB TEST: CPT | Performed by: FAMILY MEDICINE

## 2019-10-01 PROCEDURE — 36415 COLL VENOUS BLD VENIPUNCTURE: CPT | Performed by: FAMILY MEDICINE

## 2019-10-01 PROCEDURE — 90674 CCIIV4 VAC NO PRSV 0.5 ML IM: CPT | Performed by: FAMILY MEDICINE

## 2019-10-01 PROCEDURE — 90471 IMMUNIZATION ADMIN: CPT | Performed by: FAMILY MEDICINE

## 2019-10-01 PROCEDURE — 99396 PREV VISIT EST AGE 40-64: CPT | Performed by: FAMILY MEDICINE

## 2019-10-01 NOTE — PROGRESS NOTES
Subjective:     Lili Cates is a 58 y.o. female who presents for  Chief Complaint   Patient presents with   • Annual Exam     She is in for her annual physical exam and needs pap smear       This is a new patient to me.    Annual Physical Exam  Patient presents for an annual physical exam.  Her complaints at this time include a lip mass and sun damaged skin.  Concerns are painless though present for several years and patient would like it evaluated.  Patient endorses a generally healthy diet; patient's  was diagnosed with heart disease so both patient and her  have made efforts to follow a heart healthy, low-sodium diet rich in vegetables.  Patient reports that she is eliminated soft drinks from her diet.  She does not engage in regular exercise.  Patient is employed as a  in elementary school and reports that she is physically active during work hours.  Patient is currently sexually active with one male partner in the last year, her .  Patient denies any tobacco or illicit drug use.  She endorses occasional social alcohol use.  Endorses regular seatbelt use.  Patient does not own any firearms.  Her last screening mammogram was a BI-RADS 2 in September 2019.  Plans are to repeat screening next year.  Patient is scheduled to undergo a colon cancer screening with gastroenterology.  Patient has not had a cervical cancer screening and almost 26 years.  Denies any history of abnormal Pap smears.  Patient denies any concerns of emotional, sexual, or physical abuse at home.     Period Cycle (Days): (previously 28 days)  Period Pattern: (!) Irregular(February, March April, and July in 2019)  Menstrual Flow: Moderate  Dysmenorrhea: (!) Mild  Dysmenorrhea Symptoms: Cramping(less severe since adulthood)    Sexual History:  Age of First Sexual Encounter: 18 years  Number of Partners in Last Year: 1 years  Sexually Transmitted Infection History: None  Results of Last Sexually Transmitted  Infection Testing: Not applicable    Preventative:  PHQ-9 Depression Screening  Little interest or pleasure in doing things? 0   Feeling down, depressed, or hopeless? 1   Trouble falling or staying asleep, or sleeping too much?     Feeling tired or having little energy?     Poor appetite or overeating?     Feeling bad about yourself - or that you are a failure or have let yourself or your family down?     Trouble concentrating on things, such as reading the newspaper or watching television?     Moving or speaking so slowly that other people could have noticed? Or the opposite - being so fidgety or restless that you have been moving around a lot more than usual?     Thoughts that you would be better off dead, or of hurting yourself in some way?     PHQ-9 Total Score 1   If you checked off any problems, how difficult have these problems made it for you to do your work, take care of things at home, or get along with other people?       Health Maintenance   Topic Date Due   • ANNUAL PHYSICAL  12/14/1965   • TDAP/TD VACCINES (1 - Tdap) 12/14/1981   • ZOSTER VACCINE (1 of 2) 12/14/2012   • INFLUENZA VACCINE  08/01/2019   • HEPATITIS C SCREENING  08/20/2019   • PAP SMEAR  08/20/2019   • COLONOSCOPY  08/20/2019   • MAMMOGRAM  09/05/2021       Past Medical Hx:  Past Medical History:   Diagnosis Date   • Kidney stones        Past Surgical Hx:  Past Surgical History:   Procedure Laterality Date   • CYSTOSCOPY URETEROSCOPY LASER LITHOTRIPSY Left 8/18/2019    Procedure: CYSTOSCOPY, URETEROSCOPY, stone extraction, stent placement.;  Surgeon: Yvon Blake MD;  [facility]: [facility] MAIN OR;  Service: Urology       Family Hx:  Family History   Problem Relation Age of Onset   • Breast cancer Sister    • Diabetes Maternal Grandmother    • Diabetes Maternal Grandfather         Social History:  Social History     Socioeconomic History   • Marital status:      Spouse name: Not on file   • Number of children: Not on file   • Years  "of education: Not on file   • Highest education level: Not on file   Tobacco Use   • Smoking status: Never Smoker   • Smokeless tobacco: Never Used   Substance and Sexual Activity   • Alcohol use: Yes     Frequency: Never     Comment: social   • Drug use: No   • Sexual activity: Yes     Partners: Male     Birth control/protection: None       Allergies:  Patient has no known allergies.    Current Meds:  No current outpatient medications on file.    The following portions of the patient's history were reviewed and updated as appropriate: allergies, current medications, past family history, past medical history, past social history, past surgical history and problem list.    Review of Systems  Review of Systems   Constitutional: Negative for chills, diaphoresis, fatigue and fever.   HENT: Negative for congestion, rhinorrhea, sinus pressure, sneezing and sore throat.    Eyes: Negative for blurred vision, double vision and redness.   Respiratory: Negative for cough, shortness of breath and wheezing.    Cardiovascular: Negative for chest pain and leg swelling.   Gastrointestinal: Negative for abdominal pain, constipation, diarrhea, nausea and vomiting.   Endocrine: Negative for polydipsia, polyphagia and polyuria.   Genitourinary: Negative for difficulty urinating, dysuria and hematuria.   Musculoskeletal: Negative for arthralgias, gait problem and myalgias.   Skin: Positive for color change (sun damage) and skin lesions (right lower lip). Negative for rash.   Neurological: Negative for tremors, seizures, syncope and headache.   Psychiatric/Behavioral: Positive for dysphoric mood (secondary to deaths in the family). Negative for sleep disturbance and depressed mood. The patient is not nervous/anxious.        Objective:     /86 (BP Location: Left arm, Patient Position: Sitting, Cuff Size: Small Adult)   Pulse 71   Temp 97.8 °F (36.6 °C) (Oral)   Ht 165.1 cm (65\")   Wt 83.8 kg (184 lb 12.8 oz)   LMP 07/22/2019 " (Approximate) Comment: had menstrual cycle in April then July   SpO2 100%   Breastfeeding? No   BMI 30.75 kg/m²     Physical Exam   Constitutional: She is oriented to person, place, and time. She appears well-developed and well-nourished. No distress.   HENT:   Head: Normocephalic and atraumatic.   Right Ear: Tympanic membrane and ear canal normal.   Left Ear: Ear canal normal. An impacted cerumen is present.  Nose: Nose normal.   Mouth/Throat: Oropharynx is clear and moist and mucous membranes are normal. Oral lesions (skin colored mass of right lower lip) present.   Eyes: Conjunctivae and EOM are normal. Pupils are equal, round, and reactive to light. No scleral icterus.   Neck: Neck supple. No tracheal deviation present. No thyromegaly present.   Cardiovascular: Normal rate, regular rhythm, normal heart sounds and intact distal pulses.   Pulmonary/Chest: Effort normal and breath sounds normal.   Abdominal: Soft. Bowel sounds are normal. There is no tenderness.   Genitourinary: Rectal exam shows external hemorrhoid. Pelvic exam was performed with patient supine. There is no rash on the right labia. There is no rash on the left labia. Uterus is anteverted. Cervix exhibits polyp. Right adnexum is palpable.Left adnexum is palpable.Vagina exhibits loss of rugae.   Genitourinary Comments: Exam chaperoned by Renae Griggs MA   Lymphadenopathy:     She has no cervical adenopathy.   Neurological: She is alert and oriented to person, place, and time. She has normal strength.   Reflex Scores:       Bicep reflexes are 2+ on the right side and 2+ on the left side.       Patellar reflexes are 2+ on the right side and 2+ on the left side.  Skin: Skin is warm and dry. Capillary refill takes less than 2 seconds. No rash noted. She is not diaphoretic.   Psychiatric: She has a normal mood and affect. Her behavior is normal.   Vitals reviewed.      Lab Results   Component Value Date    WBC 10.30 08/19/2019    HGB 12.7 08/19/2019     HCT 38.0 08/19/2019    MCV 89.1 08/19/2019     08/19/2019     Lab Results   Component Value Date    GLUCOSE 97 08/19/2019    BUN 7 (L) 08/19/2019    CREATININE 0.70 08/19/2019    EGFRIFNONA 87 08/19/2019    BCR 10.0 08/19/2019    K 3.7 08/19/2019    CO2 21.0 (L) 08/19/2019    CALCIUM 8.4 (L) 08/19/2019    ALBUMIN 4.00 08/18/2019    AST 18 08/18/2019    ALT 13 (L) 08/18/2019     Lab Results   Component Value Date    CHOL 209 (H) 08/28/2019    TRIG 211 (H) 08/28/2019    HDL 49 08/28/2019     (H) 08/28/2019     Lab Results   Component Value Date    HGBA1C 5.0 08/18/2019     The 10-year ASCVD risk score (Edna SOTO Jr., et al., 2013) is: 2.7%    Values used to calculate the score:      Age: 58 years      Sex: Female      Is Non- : No      Diabetic: No      Tobacco smoker: No      Systolic Blood Pressure: 133 mmHg      Is BP treated: No      HDL Cholesterol: 49 mg/dL      Total Cholesterol: 209 mg/dL     Assessment/Plan:     Lili was seen today for annual exam.    Diagnoses and all orders for this visit:    Annual physical exam  -     Liquid-based Pap Smear, Screening  - Encouraged 30 minutes of moderate intensity activity at least 5 days a week.   - Encouraged regular dental visits.   - Encouraged regular seat belt use.   - Provided with educational material regarding preventive health care and AVS.      Encounter for screening for cervical cancer   -     Liquid-based Pap Smear, Screening    Class 1 obesity due to excess calories without serious comorbidity with body mass index (BMI) of 30.0 to 30.9 in adult  Discussed health risk associated with obesity.  Encouraged 30 minutes of moderate intense activity at least 5 days a week.  Patient provided with educational material regarding lifestyle modifications and AVS.    Need for hepatitis C screening test  -     Hepatitis C Antibody    Need for influenza vaccination  -     Flucelvax Quad=>4Years (PFS)    Left ear impacted cerumen  -      carbamide peroxide (DEBROX) 6.5 % otic solution; Administer 5 drops into the left ear As Needed for Ear Pain (excessive ear wax).    Lip mass  -     Ambulatory Referral to ENT (Otolaryngology)    Sun-damaged skin  -     Ambulatory Referral to Dermatology      Follow-up:     Return in about 1 year (around 10/1/2020) for Annual physical.      Signature    Rae Torres MD  Family [facility]        This document has been electronically signed by Rae Torres MD on October 1, 2019 8:13 AM

## 2019-10-03 ENCOUNTER — TRANSCRIBE ORDERS (OUTPATIENT)
Dept: ADMINISTRATIVE | Facility: HOSPITAL | Age: 59
End: 2019-10-03

## 2019-10-03 DIAGNOSIS — N13.30 HYDRONEPHROSIS OF LEFT KIDNEY: Primary | ICD-10-CM

## 2019-10-08 LAB
CYTOLOGIST CVX/VAG CYTO: NORMAL
CYTOLOGY CVX/VAG DOC CYTO: NORMAL
DX ICD CODE: NORMAL
HIV 1 & 2 AB SER-IMP: NORMAL
HPV I/H RISK 1 DNA CVX QL PROBE+SIG AMP: NEGATIVE
Lab: NORMAL
OTHER STN SPEC: NORMAL
STAT OF ADQ CVX/VAG CYTO-IMP: NORMAL

## 2019-10-25 ENCOUNTER — HOSPITAL ENCOUNTER (OUTPATIENT)
Dept: GENERAL RADIOLOGY | Facility: HOSPITAL | Age: 59
Discharge: HOME | End: 2019-10-25
Admitting: UROLOGY

## 2019-10-25 DIAGNOSIS — N13.30 HYDRONEPHROSIS OF LEFT KIDNEY: ICD-10-CM

## 2019-10-25 LAB — CREAT BLDA-MCNC: 0.7 MG/DL (ref 0.6–1.3)

## 2019-10-25 PROCEDURE — 0 IOPAMIDOL PER 1 ML: Performed by: UROLOGY

## 2019-10-25 PROCEDURE — 82565 ASSAY OF CREATININE: CPT

## 2019-10-25 PROCEDURE — 74400 UROGRAPHY IV +-KUB TOMOG: CPT

## 2019-10-25 RX ADMIN — IOPAMIDOL 100 ML: 755 INJECTION, SOLUTION INTRAVENOUS at 10:30

## (undated) DEVICE — NITINOL STONE RETRIEVAL BASKET: Brand: ZERO TIP

## (undated) DEVICE — NITINOL WIRE WITH HYDROPHILIC TIP: Brand: SENSOR

## (undated) DEVICE — PK PROC TURNOVER

## (undated) DEVICE — PRT BIOP SEALS

## (undated) DEVICE — PK CYSTO 50

## (undated) DEVICE — GLV SURG TRIUMPH LT PF LTX 7 STRL

## (undated) DEVICE — SOL IRRG H2O BG 3000ML STRL

## (undated) DEVICE — SYS IRR PUMP SGL ACTN VAC SYR 10CC

## (undated) DEVICE — DUAL LUMEN URETERAL CATHETER